# Patient Record
Sex: MALE | Race: WHITE | NOT HISPANIC OR LATINO | Employment: PART TIME | URBAN - NONMETROPOLITAN AREA
[De-identification: names, ages, dates, MRNs, and addresses within clinical notes are randomized per-mention and may not be internally consistent; named-entity substitution may affect disease eponyms.]

---

## 2019-10-31 ENCOUNTER — OFFICE VISIT (OUTPATIENT)
Dept: PSYCHIATRY | Facility: CLINIC | Age: 24
End: 2019-10-31

## 2019-10-31 DIAGNOSIS — F41.1 GENERALIZED ANXIETY DISORDER: Primary | ICD-10-CM

## 2019-10-31 PROCEDURE — 90791 PSYCH DIAGNOSTIC EVALUATION: CPT | Performed by: COUNSELOR
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
## 2019-11-05 ENCOUNTER — OFFICE VISIT (OUTPATIENT)
Dept: PSYCHIATRY | Facility: CLINIC | Age: 24
End: 2019-11-05

## 2019-11-05 VITALS
BODY MASS INDEX: 29.66 KG/M2 | HEIGHT: 72 IN | SYSTOLIC BLOOD PRESSURE: 122 MMHG | WEIGHT: 219 LBS | HEART RATE: 56 BPM | DIASTOLIC BLOOD PRESSURE: 68 MMHG

## 2019-11-05 DIAGNOSIS — F32.0 CURRENT MILD EPISODE OF MAJOR DEPRESSIVE DISORDER, UNSPECIFIED WHETHER RECURRENT (HCC): ICD-10-CM

## 2019-11-05 DIAGNOSIS — F41.1 GENERALIZED ANXIETY DISORDER: Primary | ICD-10-CM

## 2019-11-05 PROCEDURE — 90792 PSYCH DIAG EVAL W/MED SRVCS: CPT | Performed by: NURSE PRACTITIONER

## 2019-11-10 NOTE — PROGRESS NOTES
"Teodoro Nina is a 24 y.o. male who is here today for initial appointment.     Chief Complaint:     ICD-10-CM ICD-9-CM   1. Generalized anxiety disorder F41.1 300.02   2. Current mild episode of major depressive disorder, unspecified whether recurrent (CMS/[facility]) F32.0 296.21       HPI: Pt presents for initial visit and medication management of anxiety symptoms. Pt is not currently taking any psychiatric medications and reports a history of Bupropion  mg three years ago which made him feel lethargic. Pt reports daily anxiety symptoms and states he will have a panic episodes once every two weeks. Pt also reports feeling \"down\" and fatigued since September.  Pt is currently finishing up his bachelor's degree in social work. States he worries about being a failure and has difficulty with social interactions. Worries about how he is being perceived by others.     Pt reports the presence/absence of the following anxiety symptoms: (+) excessive worry, (+) excessive fear, (+) difficulty relaxing, (-) restlessness, (+) insomnia, (+) easily fatigued, (-) irritability, (-) poor concentration, (+) racing thoughts, and (+) panic episodes.       Pt reports the presence/absence of the following depressive symptoms: (+) depressed mood, (-) reduced appetite, (-) increased appetite, (-) poor concentration, (-) hopelessness, (+) worthlessness, (-) insomnia, (-) hypersomnia, (-) psychomotor agitation, (-) irritability, (-) anhedonia, (-) amotivation, (+) fatigue, (-) suicidal ideation, (-) suicidal plan, (-) suicidal intent, (-) recurrent thoughts about death, and (-) homicidal ideation.    Past Medical History:   Past Medical History:   Diagnosis Date   • Anxiety        Past Surgical History:   Past Surgical History:   Procedure Laterality Date   • ADENOIDECTOMY     • CHOLECYSTECTOMY     • TONSILLECTOMY         Social History:   Social History     Socioeconomic History   • Marital status: Single     Spouse name: Not on file   • " "Number of children: 0   • Years of education: Not on file   • Highest education level: Some college, no degree   Tobacco Use   • Smoking status: Current Every Day Smoker     Packs/day: 0.50     Types: Electronic Cigarette   • Smokeless tobacco: Never Used   Substance and Sexual Activity   • Alcohol use: Yes     Alcohol/week: 0.6 oz     Types: 1 Shots of liquor per week     Comment: OCCASIONAL   • Drug use: No   • Sexual activity: Yes     Partners: Male     Birth control/protection: None       Allergy:  No Known Allergies    Current Medications:   Current Outpatient Medications   Medication Sig Dispense Refill   • sertraline (ZOLOFT) 50 MG tablet Take 1/2 tablet PO daily x 1 week then increase to 1 tablet PO daily 27 tablet 0     No current facility-administered medications for this visit.        Lab Results:       Review of Symptoms:   Review of Systems   Constitutional: Negative.  Negative for activity change, appetite change, unexpected weight gain and unexpected weight loss.   Eyes: Negative.    Respiratory: Negative.    Cardiovascular: Negative.  Negative for chest pain.   Gastrointestinal: Negative.  Negative for diarrhea, nausea and vomiting.   Genitourinary: Negative.    Musculoskeletal: Negative.    Skin: Negative for rash and bruise.   Neurological: Negative.  Negative for dizziness, seizures and speech difficulty.   Psychiatric/Behavioral: Positive for sleep disturbance and depressed mood. Negative for agitation, behavioral problems, decreased concentration, dysphoric mood, hallucinations, self-injury, suicidal ideas, negative for hyperactivity and stress. The patient is nervous/anxious.        Physical Exam:   Physical Exam  Blood pressure 122/68, pulse 56, height 182.9 cm (72\"), weight 99.3 kg (219 lb).    Mental Status Exam:   Appearance: appropriate  Hygiene:   good  Cooperation:  Cooperative  Eye Contact:  Good  Psychomotor Behavior:  Appropriate  Mood:euthymic  Affect:  Appropriate  Hopelessness: " Denies  Speech:  Normal  Thought Process:  Goal directed  Thought Content:  Normal  Suicidal:  None  Homicidal:  None  Hallucinations:  None  Delusion:  None  Memory:  Intact  Orientation:  Person, Place, Time and Situation  Reliability:  good  Insight:  Good  Judgement:  Good  Impulse Control:  Good  Physical/Medical Issues:  No         Assessment/Plan   Diagnoses and all orders for this visit:    Generalized anxiety disorder  -     sertraline (ZOLOFT) 50 MG tablet; Take 1/2 tablet PO daily x 1 week then increase to 1 tablet PO daily    Current mild episode of major depressive disorder, unspecified whether recurrent (CMS/[facility])      Treatment Plan      Problem: Anxiety/Depression      Intervention: The prescription and adjustment of medications and monitoring of side effects. Add Zoloft. Continue therapy with NICA Mayo      Long term Goal: Overall improvement in mood and functioning per patient self -report      Short term Goal: Medication adherence. Improvement in symptoms per patient self-report.     A psychological evaluation was conducted in order to assess past and current level of functioning. Areas assessed included, but were not limited to: perception of social support, perception of ability to face and deal with challenges in life (positive functioning), anxiety symptoms, depressive symptoms, perspective on beliefs/belief system, coping skills for stress, intelligence level,  Therapeutic rapport was established. Interventions conducted today were geared towards incorporating medication management along with support for continued therapy. Education was also provided as to the med management with this provider and what to expect in subsequent sessions.    We discussed risks, benefits,goals and side effects of the above medication and the patient was agreeable with the plan.Patient was educated on the importance of compliance with treatment and follow-up appointments. Patient is aware to contact the  [facility] with any worsening of symptoms. To call for questions or concerns and return early if necessary. Patent is agreeable to go to the Emergency Department or call 911 should they begin SI/HI.     Return in about 4 weeks (around 12/3/2019) for Follow Up.    Kellie Irving, DNP, APRN, PMHNP-BC, FNP-C

## 2019-12-05 ENCOUNTER — OFFICE VISIT (OUTPATIENT)
Dept: PSYCHIATRY | Facility: CLINIC | Age: 24
End: 2019-12-05

## 2019-12-05 VITALS
SYSTOLIC BLOOD PRESSURE: 118 MMHG | HEIGHT: 72 IN | HEART RATE: 58 BPM | BODY MASS INDEX: 30.07 KG/M2 | DIASTOLIC BLOOD PRESSURE: 64 MMHG | WEIGHT: 222 LBS

## 2019-12-05 DIAGNOSIS — F32.0 CURRENT MILD EPISODE OF MAJOR DEPRESSIVE DISORDER, UNSPECIFIED WHETHER RECURRENT (HCC): ICD-10-CM

## 2019-12-05 DIAGNOSIS — F41.1 GENERALIZED ANXIETY DISORDER: Primary | ICD-10-CM

## 2019-12-05 PROCEDURE — 99214 OFFICE O/P EST MOD 30 MIN: CPT | Performed by: NURSE PRACTITIONER

## 2019-12-06 NOTE — PROGRESS NOTES
Teodoro Nina is a 24 y.o. male is here today for medication management follow-up.    Chief Complaint:      ICD-10-CM ICD-9-CM   1. Generalized anxiety disorder F41.1 300.02   2. Current mild episode of major depressive disorder, unspecified whether recurrent (CMS/[facility]) F32.0 296.21       History of Present Illness:  Pt presents for follow up visit and medication management of anxiety symptoms. Pt reports mild improvement in anxiety symptoms; states he does not feel as anxious but anxiety symptoms still persist. Pt denies any side effects but feels like his appetite has increased; has gained 3 lbs since starting medication but he attributes weight gain to Thanksgiving.      Pt reports the presence/absence of the following anxiety symptoms: (+) excessive worry, (+) excessive fear, (+) difficulty relaxing, (-) restlessness, (+) insomnia, (+) easily fatigued, (-) irritability, (-) poor concentration, (+) racing thoughts, and (+) panic episodes.         Pt reports the presence/absence of the following depressive symptoms: (+) depressed mood, (-) reduced appetite, (-) increased appetite, (-) poor concentration, (-) hopelessness, (+) worthlessness, (-) insomnia, (-) hypersomnia, (-) psychomotor agitation, (-) irritability, (-) anhedonia, (-) amotivation, (+) fatigue, (-) suicidal ideation, (-) suicidal plan, (-) suicidal intent, (-) recurrent thoughts about death, and (-) homicidal ideation.       The following portions of the patient's history were reviewed and updated as appropriate: allergies, current medications, past family history, past medical history, past social history, past surgical history and problem list.    Review of Systems;;  Review of Systems   Constitutional: Positive for appetite change. Negative for activity change and unexpected weight loss.   HENT: Negative.    Respiratory: Negative.    Cardiovascular: Negative.  Negative for chest pain.   Gastrointestinal: Negative.  Negative for diarrhea, nausea and  "vomiting.   Genitourinary: Negative.    Musculoskeletal: Negative.    Skin: Negative for rash and bruise.   Neurological: Negative.  Negative for dizziness, seizures and speech difficulty.   Psychiatric/Behavioral: Negative for agitation, behavioral problems, decreased concentration, dysphoric mood, hallucinations, self-injury, sleep disturbance, suicidal ideas, negative for hyperactivity, depressed mood and stress. The patient is nervous/anxious.        Physical Exam;;  Physical Exam  Blood pressure 118/64, pulse 58, height 182.9 cm (72\"), weight 101 kg (222 lb).    Current Medications;;    Current Outpatient Medications:   •  sertraline (ZOLOFT) 50 MG tablet, Take 1.5 tablets PO daily, Disp: 45 tablet, Rfl: 1    Lab Results:       Mental Status Exam:   Hygiene:   good  Cooperation:  Cooperative  Eye Contact:  Good  Psychomotor Behavior:  Appropriate  Mood:euthymic  Affect:  Appropriate  Hopelessness: Denies  Speech:  Normal  Thought Process:  Goal directed  Thought Content:  Normal  Suicidal:  None  Homicidal:  None  Hallucinations:  None  Delusion:  None  Memory:  Intact  Orientation:  Person, Place, Time and Situation  Reliability:  good  Insight:  Good  Judgement:  Good  Impulse Control:  Good  Physical/Medical Issues:  No         Assessment Plan;;  Diagnoses and all orders for this visit:    Generalized anxiety disorder  -     sertraline (ZOLOFT) 50 MG tablet; Take 1.5 tablets PO daily    Current mild episode of major depressive disorder, unspecified whether recurrent (CMS/HCC)  -     sertraline (ZOLOFT) 50 MG tablet; Take 1.5 tablets PO daily    Treatment Plan        Problem: Anxiety/Depression        Intervention: The prescription and adjustment of medications and monitoring of side effects. Increase Zoloft. Continue therapy with NICA Mayo        Long term Goal: Overall improvement in mood and functioning per patient self -report        Short term Goal: Medication adherence. Improvement in " symptoms per patient self-report.      A psychological evaluation was conducted in order to assess past and current level of functioning. Areas assessed included, but were not limited to: perception of social support, perception of ability to face and deal with challenges in life (positive functioning), anxiety symptoms, depressive symptoms, perspective on beliefs/belief system, coping skills for stress, intelligence level,  Therapeutic rapport was established. Interventions conducted today were geared towards incorporating medication management along with support for continued therapy. Education was also provided as to the med management with this provider and what to expect in subsequent sessions.    We discussed risks, benefits,goals and side effects of the above medication and the patient was agreeable with the plan.Patient was educated on the importance of compliance with treatment and follow-up appointments. Patient is aware to contact the [facility] with any worsening of symptoms. To call for questions or concerns and return early if necessary. Patent is agreeable to go to the Emergency Department or call 911 should they begin SI/HI.     Return in about 2 months (around 2/5/2020) for Follow Up.    Kellie Irving, DNP, APRN, PMHNP-BC, FNP-C

## 2020-01-06 ENCOUNTER — OFFICE VISIT (OUTPATIENT)
Dept: PSYCHIATRY | Facility: CLINIC | Age: 25
End: 2020-01-06

## 2020-01-06 DIAGNOSIS — F41.1 GENERALIZED ANXIETY DISORDER: Primary | ICD-10-CM

## 2020-01-06 DIAGNOSIS — F32.0 CURRENT MILD EPISODE OF MAJOR DEPRESSIVE DISORDER, UNSPECIFIED WHETHER RECURRENT (HCC): ICD-10-CM

## 2020-01-06 PROCEDURE — 90837 PSYTX W PT 60 MINUTES: CPT | Performed by: COUNSELOR

## 2020-01-06 NOTE — TREATMENT PLAN
Multi-Disciplinary Problems (from Behavioral Health Treatment Plan)    Active Problems     Problem: Anxiety  Start Date: 01/06/20    Problem Details:  The patient self-scales this problem as a 7 with 10 being the worst.       Goal Priority Start Date Expected End Date End Date    Patient will develop and implement behavioral and cognitive strategies to reduce anxiety and irrational fears. -- 01/06/20 -- --    Goal Details:  Progress toward goal:  Not appropriate to rate progress toward goal since this is the initial treatment plan.       Goal Intervention Frequency Start Date End Date    Help patient explore past emotional issues in relation to present anxiety. Weekly 01/06/20 --    Intervention Details:  Duration of treatment until until discharged.       Goal Intervention Frequency Start Date End Date    Help patient develop an awareness of their cognitive and physical responses to anxiety. Weekly 01/06/20 --    Intervention Details:  Duration of treatment until until discharged.             Problem: Relationship Issues  Start Date: 01/06/20    Problem Details:  The patient self-scales this problem as a 10 with 10 being the worst.       Goal Priority Start Date Expected End Date End Date    Patient will initiate personal change to improve relationships. -- 01/06/20 -- --    Goal Details:  Progress toward goal:  Not appropriate to rate progress toward goal since this is the initial treatment plan.       Goal Intervention Frequency Start Date End Date    Assist patient in identifying behaviors that focus on relationship building and process the changes needed to improve relationships. Weekly 01/06/20 --    Intervention Details:  Duration of treatment until until discharged.                          I have discussed and reviewed this treatment plan with the patient.  It has been printed for signatures.
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
## 2020-01-20 ENCOUNTER — OFFICE VISIT (OUTPATIENT)
Dept: PSYCHIATRY | Facility: CLINIC | Age: 25
End: 2020-01-20

## 2020-01-20 DIAGNOSIS — F41.1 GENERALIZED ANXIETY DISORDER: Primary | ICD-10-CM

## 2020-01-20 DIAGNOSIS — F32.0 CURRENT MILD EPISODE OF MAJOR DEPRESSIVE DISORDER, UNSPECIFIED WHETHER RECURRENT (HCC): ICD-10-CM

## 2020-01-20 PROCEDURE — 90837 PSYTX W PT 60 MINUTES: CPT | Performed by: COUNSELOR

## 2020-01-20 NOTE — PROGRESS NOTES
Date of Service: January 20, 2019  Time In: 10:30 AM  Time Out: 11:30 AM      PROGRESS NOTE    VISIT DIAGNOSIS:     ICD-10-CM ICD-9-CM   1. Generalized anxiety disorder F41.1 300.02   2. Current mild episode of major depressive disorder, unspecified whether recurrent (CMS/HCC) F32.0 296.21        Data:  Teodoro Nina is a 24 y.o. male who presents for individual therapy session at [facility].  Patient described feelings of depression as pervasive, irrational guilt.  Patient also verbalized understanding that his guilt was irrational, however it continues to plague him.  Patient has become more aware of his depression in terms of low self-esteem.  He has a negative perception of himself.  Patient shared situations from childhood that form the schemas perpetuating low self-esteem.  Patient seems motivated to make a change in his life and relationships.    Patient adamantly and convincingly denies current suicidal or homicidal ideation or perceptual disturbance.    Clinical Maneuvering/Intervention:  Assisted patient in processing above session content; acknowledged and normalized patient’s thoughts, feelings, and concerns.  Patient was provided with step 1 of her treatment manual of cognitive behavioral therapy that provides a structure for therapeutic intervention beyond sessions.  He was assigned chapter 1 to be read in between therapy appointments to help him learn skills to lessen depression and cope with thoughts and feelings maintaining his low self-esteem.  Structured therapy helps to summarize and organize learning so that patients can remember what is been used and discussed in therapy and build upon that.    Allowed patient to freely discuss issues without interruption or judgment. Provided safe, confidential environment to facilitate the development of positive therapeutic relationship and encourage open, honest communication. Assisted patient in identifying risk factors which would  indicate the need for higher level of care including thoughts to harm self or others and/or self-harming behavior and encouraged patient to contact this office, call 911, or present to the nearest emergency room should any of these events occur. Discussed crisis intervention services and means to access.        Mental Status Exam  Hygiene:  good  Dress:  casual  Attitude:  Cooperative  Motor Activity:  Appropriate  Speech:  Normal  Mood:  Sad, pleasant, hopeful  Affect:  depressed  Thought Processes:  Goal directed  Thought Content:  normal  Suicidal Thoughts:  denies  Homicidal Thoughts:  denies  Crisis Safety Plan: yes, to come to the emergency room.  Hallucinations:  {Actions; denies/admits to:5300  Functional Status: No impairment    Progress toward goal: Not at goal    Plan     Patient will continue in individual outpatient therapy with focus on improved functioning and coping skills. Clinical maneuvering will consist of, but not limited to, Cognitive Behavioral Therapy and Solution Focused Therapy to improve functioning, maintain stability, and avoid decompensation and the need for higher level of care.     Patient will adhere to medication regimen as prescribed and report any side effects. Patient will contact this office, call 911 or present to the nearest emergency room should suicidal or homicidal ideations occur.     Return in about 2 weeks, or earlier if symptoms worsen or fail to improve.           Sary Fang [facility]      Please note that portions of this note were completed with a voice recognition program. Efforts were made to edit dictation, but occasionally words are mistranscribed.

## 2020-02-03 ENCOUNTER — OFFICE VISIT (OUTPATIENT)
Dept: PSYCHIATRY | Facility: CLINIC | Age: 25
End: 2020-02-03

## 2020-02-03 DIAGNOSIS — F32.0 CURRENT MILD EPISODE OF MAJOR DEPRESSIVE DISORDER, UNSPECIFIED WHETHER RECURRENT (HCC): ICD-10-CM

## 2020-02-03 DIAGNOSIS — F41.1 GENERALIZED ANXIETY DISORDER: Primary | ICD-10-CM

## 2020-02-03 PROCEDURE — 90837 PSYTX W PT 60 MINUTES: CPT | Performed by: COUNSELOR

## 2020-02-03 NOTE — PROGRESS NOTES
".Date of Service: January 3, 2020  Time In: 9:30 AM  Time Out: 10:30 AM      PROGRESS NOTE  VISIT DIAGNOSIS:     ICD-10-CM ICD-9-CM   1. Generalized anxiety disorder F41.1 300.02   2. Current mild episode of major depressive disorder, unspecified whether recurrent (CMS/HCC) F32.0 296.21        Data:  Teodoro Nina is a 24 y.o. male who presents for individual therapy session at [facility]. Patient has begun to develop a more positive self-image and, therefore, does not feel \"stuck\" in his relationship any longer with a partner who \"relies on me for everything\". He has begun to make positive comments about himself. He has taken steps toward his future since the last session. Patient tells me that he applied for graduate school for social work. Current stressors include finding the courage to break up with s/o. He described feeling fearful of having to officially break up with his boyfriend as this is his first relationship and he does not like confrontation. He has been stress eating. Patient verbalized an understanding of how stress eating is maladaptive coping.     Patient adamantly and convincingly denies current suicidal or homicidal ideation or perceptual disturbance.    Clinical Maneuvering/Intervention:  Assisted patient in processing above session content; acknowledged and normalized patient’s thoughts, feelings, and concerns. Patient indicated that he has become increasingly aware of how he communicates his negative self-image; this progress was processed. Patient responds well to CBT. He was assigned self-esteem building exercises using CBT concepts.     Allowed patient to freely discuss issues without interruption or judgment. Provided safe, confidential environment to facilitate the development of positive therapeutic relationship and encourage open, honest communication. Assisted patient in identifying risk factors which would indicate the need for higher level of care including " thoughts to harm self or others and/or self-harming behavior and encouraged patient to contact this office, call 911, or present to the nearest emergency room should any of these events occur. Discussed crisis intervention services and means to access.        Mental Status Exam  Hygiene:  good  Dress:  casual  Attitude:  Cooperative  Motor Activity:  Appropriate  Speech:  Normal  Mood:  anxious  Affect:  calm and pleasant and anxious  Thought Processes:  Goal directed  Thought Content:  normal  Suicidal Thoughts:  denies  Homicidal Thoughts:  denies  Crisis Safety Plan: yes, to come to the emergency room.  Hallucinations:  {Actions; denies/admits to:5300  Functional Status: No impairment    Progress toward goal: Not at goal    Plan     Patient will continue in individual outpatient therapy with focus on improved functioning and coping skills. Clinical maneuvering will consist of, but not limited to, Cognitive Behavioral Therapy and Solution Focused Therapy to improve functioning, maintain stability, and avoid decompensation and the need for higher level of care.     Patient will adhere to medication regimen as prescribed and report any side effects. Patient will contact this office, call 911 or present to the nearest emergency room should suicidal or homicidal ideations occur.     Return in about 2 weeks, or earlier if symptoms worsen or fail to improve.         VISIT DIAGNOSIS:     ICD-10-CM ICD-9-CM   1. Generalized anxiety disorder F41.1 300.02   2. Current mild episode of major depressive disorder, unspecified whether recurrent (CMS/[facility]) F32.0 296.21        Sary Fang [facility]      Please note that portions of this note were completed with a voice recognition program. Efforts were made to edit dictation, but occasionally words are mistranscribed.

## 2020-02-06 DIAGNOSIS — F32.0 CURRENT MILD EPISODE OF MAJOR DEPRESSIVE DISORDER, UNSPECIFIED WHETHER RECURRENT (HCC): ICD-10-CM

## 2020-02-06 DIAGNOSIS — F41.1 GENERALIZED ANXIETY DISORDER: ICD-10-CM

## 2020-02-17 ENCOUNTER — OFFICE VISIT (OUTPATIENT)
Dept: PSYCHIATRY | Facility: CLINIC | Age: 25
End: 2020-02-17

## 2020-02-17 DIAGNOSIS — F41.1 GENERALIZED ANXIETY DISORDER: Primary | ICD-10-CM

## 2020-02-17 DIAGNOSIS — F32.0 CURRENT MILD EPISODE OF MAJOR DEPRESSIVE DISORDER, UNSPECIFIED WHETHER RECURRENT (HCC): ICD-10-CM

## 2020-02-17 PROCEDURE — 90837 PSYTX W PT 60 MINUTES: CPT | Performed by: COUNSELOR

## 2020-02-17 NOTE — PROGRESS NOTES
.Date of Service: February 17. 2020  Time In: 10:30 AM  Time Out: 11:30 AM      PROGRESS NOTE  VISIT DIAGNOSIS:     ICD-10-CM ICD-9-CM   1. Generalized anxiety disorder F41.1 300.02   2. Current mild episode of major depressive disorder, unspecified whether recurrent (CMS/[facility]) F32.0 296.21        Data:  Teodoro Nina is a 24 y.o. male who presents for individual therapy session at [facility]. Patient continues to struggle with breaking up with his long-term boyfriend. Although, he is the one initiating the break up, he is left deeply unpleasant feelings of guilt, angst, and grief.   He previously thought being alone was worse than being in a relationship that was very one-sided (patient pays all bills and does all housework) however he is no longer fearful of being alone. He is tired of being taken advantage of.  Pt is fearful of using assertiveness.  He started the conversation about dissatisfactions, broken promises, and trying to work through them as a team.  His boyfriend did not receive this information well and became very defensive.  He has not felt comfortable to reinitiate the conversation.  Patient reports his anxiety has been high, he has been going to bed around 7 PM to try to avoid his boyfriend and the having the conversation.       Patient adamantly and convincingly denies current suicidal or homicidal ideation or perceptual disturbance.    Clinical Maneuvering/Intervention:  Assisted patient in processing above session content; acknowledged and normalized patient’s thoughts, feelings, and concerns.  Provided psychoeducation on thorough communication.  We talked about letting his boyfriend know how he is feeling, even if he thinks this may hurt or disappoint his boyfriend.  It is important to include his partner as much as possible regarding discussions around his feeling so that the break-up does not take him by surprise.     Allowed patient to freely discuss issues without  interruption or judgment. Provided safe, confidential environment to facilitate the development of positive therapeutic relationship and encourage open, honest communication. Assisted patient in identifying risk factors which would indicate the need for higher level of care including thoughts to harm self or others and/or self-harming behavior and encouraged patient to contact this office, call 911, or present to the nearest emergency room should any of these events occur. Discussed crisis intervention services and means to access.        Mental Status Exam  Hygiene:  good  Dress:  casual  Attitude:  Cooperative  Motor Activity:  Appropriate  Speech:  Normal  Mood:  anxious  Affect:  anxious  Thought Processes:  Goal directed  Thought Content:  normal  Suicidal Thoughts:  denies  Homicidal Thoughts:  denies  Crisis Safety Plan: yes, to come to the emergency room.  Hallucinations:  denies  Functional Status: No impairment    Progress toward goal: Not at goal    Plan     Patient will continue in individual outpatient therapy with focus on improved functioning and coping skills. Clinical maneuvering will consist of, but not limited to, Cognitive Behavioral Therapy and Solution Focused Therapy to improve functioning, maintain stability, and avoid decompensation and the need for higher level of care.     Patient will adhere to medication regimen as prescribed and report any side effects. Patient will contact this office, call 911 or present to the nearest emergency room should suicidal or homicidal ideations occur.     Return in about 2 weeks, or earlier if symptoms worsen or fail to improve.         VISIT DIAGNOSIS:     ICD-10-CM ICD-9-CM   1. Generalized anxiety disorder F41.1 300.02   2. Current mild episode of major depressive disorder, unspecified whether recurrent (CMS/[facility]) F32.0 296.21        Sary Fang [facility]      Please note that portions of this note were completed with a voice recognition program. Efforts  were made to edit dictation, but occasionally words are mistranscribed.

## 2020-03-11 ENCOUNTER — OFFICE VISIT (OUTPATIENT)
Dept: PSYCHIATRY | Facility: CLINIC | Age: 25
End: 2020-03-11

## 2020-03-11 VITALS
DIASTOLIC BLOOD PRESSURE: 64 MMHG | HEIGHT: 72 IN | BODY MASS INDEX: 30.34 KG/M2 | HEART RATE: 57 BPM | WEIGHT: 224 LBS | SYSTOLIC BLOOD PRESSURE: 110 MMHG

## 2020-03-11 DIAGNOSIS — F41.1 GENERALIZED ANXIETY DISORDER: Primary | ICD-10-CM

## 2020-03-11 DIAGNOSIS — F40.10 SOCIAL ANXIETY DISORDER: ICD-10-CM

## 2020-03-11 PROCEDURE — 99214 OFFICE O/P EST MOD 30 MIN: CPT | Performed by: NURSE PRACTITIONER

## 2020-03-11 RX ORDER — HYDROXYZINE HYDROCHLORIDE 10 MG/1
TABLET, FILM COATED ORAL
Qty: 60 TABLET | Refills: 2 | Status: SHIPPED | OUTPATIENT
Start: 2020-03-11 | End: 2020-06-16 | Stop reason: SDUPTHER

## 2020-03-11 RX ORDER — SERTRALINE HYDROCHLORIDE 100 MG/1
100 TABLET, FILM COATED ORAL DAILY
Qty: 30 TABLET | Refills: 2 | Status: SHIPPED | OUTPATIENT
Start: 2020-03-11 | End: 2020-06-16 | Stop reason: SDUPTHER

## 2020-03-11 NOTE — PROGRESS NOTES
"Teodoro Nina is a 24 y.o. male is here today for medication management follow-up.    Chief Complaint:      ICD-10-CM ICD-9-CM   1. Generalized anxiety disorder F41.1 300.02   2. Social anxiety disorder F40.10 300.23       History of Present Illness:  Pt presents for follow up visit and medication management of anxiety symptoms. Pt reports some continued anxiety symptoms since Zoloft was increased to 75 mg. Reports concern over social anxiety symptoms; states he will feel cold and hands become \"sweaty\". Pt states he has decided to take a year off before attending graduate school.     Pt reports the presence/absence of the following anxiety symptoms: (+) excessive worry, (+) excessive fear, (+) difficulty relaxing, (-) restlessness, (-) insomnia, (-) easily fatigued, (-) irritability, (-) poor concentration, (+) racing thoughts, and (+) panic episodes.           The following portions of the patient's history were reviewed and updated as appropriate: allergies, current medications, past family history, past medical history, past social history, past surgical history and problem list.    Review of Systems;;  Review of Systems   Constitutional: Negative.  Negative for activity change, appetite change and unexpected weight loss.   HENT: Negative.    Respiratory: Negative.    Cardiovascular: Negative.  Negative for chest pain.   Gastrointestinal: Negative.  Negative for diarrhea, nausea and vomiting.   Genitourinary: Negative.    Musculoskeletal: Negative.    Skin: Negative for rash and bruise.   Neurological: Negative.  Negative for dizziness, seizures and speech difficulty.   Psychiatric/Behavioral: Negative for agitation, behavioral problems, decreased concentration, dysphoric mood, hallucinations, self-injury, sleep disturbance, suicidal ideas, negative for hyperactivity, depressed mood and stress. The patient is nervous/anxious.        Physical Exam;;  Physical Exam  Blood pressure 110/64, pulse 57, height 182.9 cm " "(72\"), weight 102 kg (224 lb).    Current Medications;;    Current Outpatient Medications:   •  hydrOXYzine (ATARAX) 10 MG tablet, Take 1 to 2 tablets PO daily prn panic, Disp: 60 tablet, Rfl: 2  •  sertraline (ZOLOFT) 100 MG tablet, Take 1 tablet by mouth Daily., Disp: 30 tablet, Rfl: 2    Lab Results:       Mental Status Exam:   Hygiene:   good  Cooperation:  Cooperative  Eye Contact:  Good  Psychomotor Behavior:  Appropriate  Mood:euthymic  Affect:  Appropriate  Hopelessness: Denies  Speech:  Normal  Thought Process:  Goal directed  Thought Content:  Normal  Suicidal:  None  Homicidal:  None  Hallucinations:  None  Delusion:  None  Memory:  Intact  Orientation:  Person, Place, Time and Situation  Reliability:  good  Insight:  Good  Judgement:  Good  Impulse Control:  Good  Physical/Medical Issues:  No         Assessment Plan;;  Diagnoses and all orders for this visit:    Generalized anxiety disorder  -     sertraline (ZOLOFT) 100 MG tablet; Take 1 tablet by mouth Daily.    Social anxiety disorder  -     sertraline (ZOLOFT) 100 MG tablet; Take 1 tablet by mouth Daily.  -     hydrOXYzine (ATARAX) 10 MG tablet; Take 1 to 2 tablets PO daily prn panic    Treatment Plan        Problem: Anxiety/Depression        Intervention: The prescription and adjustment of medications and monitoring of side effects. Increase Zoloft. Add Hydroxyzine prn. [facility]        Long term Goal: Overall improvement in mood and functioning per patient self -report        Short term Goal: Medication adherence. Improvement in symptoms per patient self-report.      A psychological evaluation was conducted in order to assess past and current level of functioning. Areas assessed included, but were not limited to: perception of social support, perception of ability to face and deal with challenges in life (positive functioning), anxiety symptoms, depressive symptoms, perspective on beliefs/belief system, coping " skills for stress, intelligence level,  Therapeutic rapport was established. Interventions conducted today were geared towards incorporating medication management along with support for continued therapy. Education was also provided as to the med management with this provider and what to expect in subsequent sessions.    We discussed risks, benefits,goals and side effects of the above medication and the patient was agreeable with the plan.Patient was educated on the importance of compliance with treatment and follow-up appointments. Patient is aware to contact the [facility] with any worsening of symptoms. To call for questions or concerns and return early if necessary. Patent is agreeable to go to the Emergency Department or call 911 should they begin SI/HI.     Return in about 3 months (around 6/11/2020) for Follow Up.    Kellie Irving, DNP, APRN, PMHNP-BC, FNP-C

## 2020-03-16 ENCOUNTER — OFFICE VISIT (OUTPATIENT)
Dept: PSYCHIATRY | Facility: CLINIC | Age: 25
End: 2020-03-16

## 2020-03-16 DIAGNOSIS — F41.1 GENERALIZED ANXIETY DISORDER: Primary | ICD-10-CM

## 2020-03-16 PROCEDURE — 90837 PSYTX W PT 60 MINUTES: CPT | Performed by: COUNSELOR

## 2020-03-16 NOTE — PROGRESS NOTES
"Date of Service: March 16, 2020  Time In: 9:30 AM  Time Out: 10:30 AM      PROGRESS NOTE  VISIT DIAGNOSIS:     ICD-10-CM ICD-9-CM   1. Generalized anxiety disorder F41.1 300.02        Data:  Teodoro Nina is a 24 y.o. male who presents for individual therapy session at [facility].  Patient continues to struggle with his relationship.  He tells me he is having trouble navigating the ins and outs of .  He has feelings for another friend but still cannot find the nerve to break-up with his long-term boyfriend to even explore those feelings.  Patient tells me his current relationship has dissolved into just being roommates.  Patient discussed feeling responsible for his boyfriend.  He pays the bills and helps his boyfriend a lot with his emotional issues.  Patient has been reading a lot about how common it is for young bergman men to have these experiences -- meaning they've had very little experience with dating and the opportunity to make \"teenage mistakes\".  He does not feel like people understand what he is going through.  Most of his friends have had relationships in the past but this is his first one.  His mother is not very understanding either.    Patient adamantly and convincingly denies current suicidal or homicidal ideation or perceptual disturbance.    Clinical Maneuvering/Intervention:  Assisted patient in processing above session content; acknowledged and normalized patient’s thoughts, feelings, and concerns.  Supportive psychotherapy was provided as he discussed his fear of  from his long-term boyfriend and first relationship.  Assisted him in discussing the intensity of his feared situation and normalized the fears having significance and validity.  We focused on reducing the intensity of his reaction to the fear, therefore further reducing the anxiety to his situation in general.    Allowed patient to freely discuss issues without interruption or judgment. Provided " safe, confidential environment to facilitate the development of positive therapeutic relationship and encourage open, honest communication. Assisted patient in identifying risk factors which would indicate the need for higher level of care including thoughts to harm self or others and/or self-harming behavior and encouraged patient to contact this office, call 911, or present to the nearest emergency room should any of these events occur. Discussed crisis intervention services and means to access.        Mental Status Exam  Hygiene:  good  Dress:  casual  Attitude:  Cooperative  Motor Activity:  Appropriate  Speech:  Normal  Mood:  anxious  Affect:  calm and pleasant  Thought Processes:  Goal directed  Thought Content:  normal  Suicidal Thoughts:  denies  Homicidal Thoughts:  denies  Crisis Safety Plan: yes, to come to the emergency room.  Hallucinations:  denies  Functional Status: No impairment    Progress toward goal: Not at goal    Plan     Patient will continue in individual outpatient therapy with focus on improved functioning and coping skills. Clinical maneuvering will consist of, but not limited to, Cognitive Behavioral Therapy and Solution Focused Therapy to improve functioning, maintain stability, and avoid decompensation and the need for higher level of care.     Patient will adhere to medication regimen as prescribed and report any side effects. Patient will contact this office, call 911 or present to the nearest emergency room should suicidal or homicidal ideations occur.     Return in about 2 weeks, or earlier if symptoms worsen or fail to improve.    Reiterated our 24-hour cancellation/no show notice preference      VISIT DIAGNOSIS:     ICD-10-CM ICD-9-CM   1. Generalized anxiety disorder F41.1 300.02        [facility]      Please note that portions of this note were completed with a voice recognition program. Efforts were made to edit dictation, but occasionally words are  mistranscribed.

## 2020-03-30 ENCOUNTER — OFFICE VISIT (OUTPATIENT)
Dept: PSYCHIATRY | Facility: CLINIC | Age: 25
End: 2020-03-30

## 2020-03-30 DIAGNOSIS — F41.1 GENERALIZED ANXIETY DISORDER: Primary | ICD-10-CM

## 2020-03-30 DIAGNOSIS — F32.0 CURRENT MILD EPISODE OF MAJOR DEPRESSIVE DISORDER, UNSPECIFIED WHETHER RECURRENT (HCC): ICD-10-CM

## 2020-03-30 PROCEDURE — 90837 PSYTX W PT 60 MINUTES: CPT | Performed by: COUNSELOR

## 2020-03-30 NOTE — PROGRESS NOTES
Date of Service: March 30, 2020  Time In: 9:30 AM  Time Out: 10:30 AM      PROGRESS NOTE  VISIT DIAGNOSIS:     ICD-10-CM ICD-9-CM   1. Generalized anxiety disorder F41.1 300.02   2. Current mild episode of major depressive disorder, unspecified whether recurrent (CMS/HCC) F32.0 296.21        Data:  Teodoro Nina is a 24 y.o. male who presents for individual therapy session at [facility]. Patient reports having general stress about the coronavirus.  Patient reports he often struggles with anxiety regarding public health issues and the current coronavirus outbreak is triggering increased anxiety, especially with heightened media attention.    Patient states he has been nuno enough to keep his job and regular hours during this time.  When he is not at work, he tries to stay inside and exercise social distancing as often as possible.  Patient has noticed at work that he seems more anxious and his coworkers have noticed he is jittery.  He reported a low frustration tolerance at work.  Things at home are not great with his long-term boyfriend.  He is ready to in the relationship and his boyfriend is desperately trying to hang onto what is left.  Patient states it is annoying him.     Patient adamantly and convincingly denies current suicidal or homicidal ideation or perceptual disturbance.    Clinical Maneuvering/Intervention:  Assisted patient in processing above session content; acknowledged and normalized patient’s thoughts, feelings, and concerns. Assisted patient in utilizing stress reduction techniques. Provided patient with helpful tips and strategies to help herself and loved ones struggling with anxiety around the coronavirus. He was encouraged to use the resources about how to mitigate against increased anxiety during this time. Allowed patient to freely discuss issues without interruption or judgment. Provided safe, confidential environment to facilitate the development of positive  therapeutic relationship and encourage open, honest communication. Assisted patient in identifying risk factors which would indicate the need for higher level of care including thoughts to harm self or others and/or self-harming behavior and encouraged patient to contact this office, call 911, or present to the nearest emergency room should any of these events occur. Discussed crisis intervention services and means to access.       Mental Status Exam  Hygiene:  good  Dress:  casual  Attitude:  Cooperative  Motor Activity:  Appropriate  Speech:  Normal  Mood:  anxious  Affect:  calm and pleasant  Thought Processes:  Goal directed  Thought Content:  normal  Suicidal Thoughts:  denies  Homicidal Thoughts:  denies  Crisis Safety Plan: yes, to come to the emergency room.  Hallucinations:  denies  Functional Status: No impairment    Progress toward goal: Not at goal    Plan     Patient will continue in individual outpatient therapy with focus on improved functioning and coping skills. Clinical maneuvering will consist of, but not limited to, Cognitive Behavioral Therapy and Solution Focused Therapy to improve functioning, maintain stability, and avoid decompensation and the need for higher level of care.     Patient will adhere to medication regimen as prescribed and report any side effects. Patient will contact this office, call 911 or present to the nearest emergency room should suicidal or homicidal ideations occur.     Return in about 2 weeks, or earlier if symptoms worsen or fail to improve.    Reiterated our 24-hour cancellation/no show notice preference      VISIT DIAGNOSIS:     ICD-10-CM ICD-9-CM   1. Generalized anxiety disorder F41.1 300.02   2. Current mild episode of major depressive disorder, unspecified whether recurrent (CMS/[facility]) F32.0 296.21        Sary Fang [facility]      Please note that portions of this note were completed with a voice recognition program. Efforts were made to edit dictation, but  occasionally words are mistranscribed.

## 2020-05-12 ENCOUNTER — DOCUMENTATION (OUTPATIENT)
Dept: PSYCHIATRY | Facility: CLINIC | Age: 25
End: 2020-05-12

## 2020-05-12 NOTE — PROGRESS NOTES
.DISCHARGE SUMMARY   Name: Teodoro Nina   Date: May 12, 2020   Date of First Consultation: 10.31.2019   Duration of the Treatment: 6 months     Summary of the Presenting Difficulties:  Patient presented to therapy with classic symptoms of anxiety -- irrational worry and repetitive thoughts that something tragic is going to happen. He struggled with social interactions and had a tendency to overanalyze everything and everyone around him.   Other Areas Addressed During Treatment:   Through the course of treatment, it became evident that the client was experiencing anxiety. Addressing occasional panic symptoms also became a focus of our work together. Issues of relational discord also became an important focus of the treatment.     Overview of the Treatment Process:   The client was seen on a weekly/biweekly basis in supportive-dynamic psychotherapy with the incorporation of cognitive-behavioral techniques to address his chief complaints. A good therapeutic alliance was easily established and maintained throughout the course of our work together. During the initial phase of treatment, the patient gained awareness regarding  psychological/emotional position within the therapeutic relationship. Therapy provided a place within which patient could express and explore his concerns, while being available as support to family and friends.       Nature of the Termination:  The patient did experience significant improvement and gains throughout the treatment. Patient expressed the wish to terminate treatment based on feeling satisfied with his level of improvement and COVID-19 pandemic.     Gains Made/Progress:   Patient made significant gains during the course of treatment and reported being satisfied with his progress. Early on, patient came to appreciate and value capacity to express needs, thoughts, and feelings when feeling entitled to do so. Throughout the course of therapy, the patient gained greater awareness of a tendency  to neglect his own needs and wishes and developed improved balance between his work, relationship life, and leisure/creative/play time. The client was also able to use the space of the therapy to explore and better clarify  aspirations and the factors that had been preventing making decisions in this regard. The supportive features of the therapy appeared to be very helpful in enhancing the patient's coping skills.   Limitations of the treatment:   Patient discontinued therapy due to COVID19 limiting in person sessions. He did not want to engage in therapy virtually.   Remaining difficulties and/or concerns:  There were no significant remaining difficulties or concerns observed or expressed by the patient at the end of treatment.   Recommendations:   Though the client made tremendous progress and met main goals during the course of treatment, in the interest of maximizing psychological well-being and self-understanding, the patient may benefit from ongoing exploratory psychodynamic/analytic therapy in the future.   Follow-up:  No specific follow-up plan is indicated; the client was informed and is free to contact me in the future if needed.   Additional Comments:  Encouraged him to return to therapy when he feels safe to do so.       This document has been electronically signed by NICA Mayo, [facility]  May 12, 2020 12:41

## 2020-06-16 ENCOUNTER — TELEMEDICINE (OUTPATIENT)
Dept: PSYCHIATRY | Facility: CLINIC | Age: 25
End: 2020-06-16

## 2020-06-16 DIAGNOSIS — F40.10 SOCIAL ANXIETY DISORDER: ICD-10-CM

## 2020-06-16 DIAGNOSIS — F41.1 GENERALIZED ANXIETY DISORDER: ICD-10-CM

## 2020-06-16 PROCEDURE — 99213 OFFICE O/P EST LOW 20 MIN: CPT | Performed by: NURSE PRACTITIONER

## 2020-06-16 RX ORDER — SERTRALINE HYDROCHLORIDE 100 MG/1
100 TABLET, FILM COATED ORAL DAILY
Qty: 30 TABLET | Refills: 5 | Status: SHIPPED | OUTPATIENT
Start: 2020-06-16 | End: 2020-07-28 | Stop reason: SDUPTHER

## 2020-06-16 RX ORDER — HYDROXYZINE HYDROCHLORIDE 10 MG/1
TABLET, FILM COATED ORAL
Qty: 60 TABLET | Refills: 5 | Status: SHIPPED | OUTPATIENT
Start: 2020-06-16 | End: 2020-12-14 | Stop reason: SDUPTHER

## 2020-06-16 NOTE — PROGRESS NOTES
Teodoro Nina is a 25 y.o. male is here today for medication management follow-up.    Chief Complaint:      ICD-10-CM ICD-9-CM   1. Generalized anxiety disorder F41.1 300.02   2. Social anxiety disorder F40.10 300.23       History of Present Illness:  Pt presents for follow up visit and medication management of anxiety symptoms via [employer]. Pt reports stable mood symptoms with current medications.    Pt reports the presence/absence of the following anxiety symptoms: (-) excessive worry, (-) excessive fear, (-) difficulty relaxing, (-) restlessness, (-) insomnia, (-) easily fatigued, (-) irritability, (-) poor concentration, (-) racing thoughts, and (-) panic episodes.      The following portions of the patient's history were reviewed and updated as appropriate: allergies, current medications, past family history, past medical history, past social history, past surgical history and problem list.    Review of Systems;;  Review of Systems   Constitutional: Negative.  Negative for activity change, appetite change and unexpected weight loss.   HENT: Negative.    Respiratory: Negative.    Cardiovascular: Negative.  Negative for chest pain.   Gastrointestinal: Negative.  Negative for diarrhea, nausea and vomiting.   Genitourinary: Negative.    Musculoskeletal: Negative.    Skin: Negative for rash and bruise.   Neurological: Negative.  Negative for dizziness, seizures and speech difficulty.   Psychiatric/Behavioral: Negative.  Negative for agitation, behavioral problems, decreased concentration, dysphoric mood, hallucinations, self-injury, sleep disturbance, suicidal ideas, negative for hyperactivity, depressed mood and stress. The patient is not nervous/anxious.        Physical Exam;;  Physical Exam    There were no vitals taken for this visit.    Current Medications;;    Current Outpatient Medications:   •  hydrOXYzine (ATARAX) 10 MG tablet, Take 1 to 2 tablets PO daily prn panic, Disp: 60 tablet, Rfl: 5  •   sertraline (Zoloft) 100 MG tablet, Take 1 tablet by mouth Daily., Disp: 30 tablet, Rfl: 5    Lab Results:       Mental Status Exam:   Hygiene:   good  Cooperation:  Cooperative  Eye Contact:  Good  Psychomotor Behavior:  Appropriate  Mood:euthymic  Affect:  Appropriate  Hopelessness: Denies  Speech:  Normal  Thought Process:  Goal directed  Thought Content:  Normal  Suicidal:  None  Homicidal:  None  Hallucinations:  None  Delusion:  None  Memory:  Intact  Orientation:  Person, Place, Time and Situation  Reliability:  good  Insight:  Good  Judgement:  Good  Impulse Control:  Good  Physical/Medical Issues:  No         Assessment Plan;;  Diagnoses and all orders for this visit:    Generalized anxiety disorder  -     sertraline (Zoloft) 100 MG tablet; Take 1 tablet by mouth Daily.    Social anxiety disorder  -     hydrOXYzine (ATARAX) 10 MG tablet; Take 1 to 2 tablets PO daily prn panic  -     sertraline (Zoloft) 100 MG tablet; Take 1 tablet by mouth Daily.    Treatment Plan        Problem: Anxiety/Depression        Intervention: The prescription and adjustment of medications and monitoring of side effects. Continue current dose of Zoloft and  Hydroxyzine prn. Continue therapy with NICA Mayo at [facility].        Long term Goal: Overall improvement in mood and functioning per patient self -report        Short term Goal: Medication adherence. Improvement in symptoms per patient self-report.      A psychological evaluation was conducted in order to assess past and current level of functioning. Areas assessed included, but were not limited to: perception of social support, perception of ability to face and deal with challenges in life (positive functioning), anxiety symptoms, depressive symptoms, perspective on beliefs/belief system, coping skills for stress, intelligence level,  Therapeutic rapport was established. Interventions conducted today were geared towards incorporating medication management along with  support for continued therapy. Education was also provided as to the med management with this provider and what to expect in subsequent sessions.    We discussed risks, benefits,goals and side effects of the above medication and the patient was agreeable with the plan.Patient was educated on the importance of compliance with treatment and follow-up appointments. Patient is aware to contact the [facility] with any worsening of symptoms. To call for questions or concerns and return early if necessary. Patent is agreeable to go to the Emergency Department or call 911 should they begin SI/HI.     Return in about 6 months (around 12/16/2020) for Follow Up.    Kellie Irving, DNP, APRN, PMHNP-BC, FNP-C

## 2020-07-28 ENCOUNTER — TELEPHONE (OUTPATIENT)
Dept: PSYCHIATRY | Facility: CLINIC | Age: 25
End: 2020-07-28

## 2020-07-28 DIAGNOSIS — F40.10 SOCIAL ANXIETY DISORDER: ICD-10-CM

## 2020-07-28 DIAGNOSIS — F41.1 GENERALIZED ANXIETY DISORDER: ICD-10-CM

## 2020-07-28 RX ORDER — SERTRALINE HYDROCHLORIDE 100 MG/1
150 TABLET, FILM COATED ORAL DAILY
Qty: 45 TABLET | Refills: 2 | Status: SHIPPED | OUTPATIENT
Start: 2020-07-28 | End: 2020-09-14 | Stop reason: SDUPTHER

## 2020-07-28 NOTE — TELEPHONE ENCOUNTER
Attempted to contact pt voicemail is full will try to contact pt later this evening or in the morning if pt doesn't answer to give options on medication advice

## 2020-07-28 NOTE — TELEPHONE ENCOUNTER
If Zoloft helped previously and he is not having side effects, could consider dose increase. Otherwise, he would likely need to switch to an alternate medication.

## 2020-07-28 NOTE — TELEPHONE ENCOUNTER
Pt called in stating that he was extremely irritable wanting to snap on friends, family, management at work. Started about two weeks ago. He said he feels he's not thinking clearly and very irritable, (like how he felt before he started medication) Has taken Wellbutrin in the past. Currently taking Zoloft 100 mg and hydroxyzine as needed for panic.  Pt states nothing has really changed in his life, his dad did have a heart attack 3 weeks ago but doesn't feel its related to his feelings. Please Advise

## 2020-09-14 DIAGNOSIS — F40.10 SOCIAL ANXIETY DISORDER: ICD-10-CM

## 2020-09-14 DIAGNOSIS — F41.1 GENERALIZED ANXIETY DISORDER: ICD-10-CM

## 2020-09-14 RX ORDER — SERTRALINE HYDROCHLORIDE 100 MG/1
150 TABLET, FILM COATED ORAL DAILY
Qty: 45 TABLET | Refills: 2 | Status: SHIPPED | OUTPATIENT
Start: 2020-09-14 | End: 2020-12-14 | Stop reason: SDUPTHER

## 2020-12-14 ENCOUNTER — OFFICE VISIT (OUTPATIENT)
Dept: PSYCHIATRY | Facility: CLINIC | Age: 25
End: 2020-12-14

## 2020-12-14 VITALS
RESPIRATION RATE: 18 BRPM | DIASTOLIC BLOOD PRESSURE: 74 MMHG | BODY MASS INDEX: 30.75 KG/M2 | HEART RATE: 58 BPM | SYSTOLIC BLOOD PRESSURE: 118 MMHG | TEMPERATURE: 97.4 F | HEIGHT: 73 IN | WEIGHT: 232 LBS

## 2020-12-14 DIAGNOSIS — F32.0 CURRENT MILD EPISODE OF MAJOR DEPRESSIVE DISORDER, UNSPECIFIED WHETHER RECURRENT (HCC): Primary | ICD-10-CM

## 2020-12-14 DIAGNOSIS — F40.10 SOCIAL ANXIETY DISORDER: ICD-10-CM

## 2020-12-14 DIAGNOSIS — F41.1 GENERALIZED ANXIETY DISORDER: ICD-10-CM

## 2020-12-14 PROCEDURE — 99214 OFFICE O/P EST MOD 30 MIN: CPT | Performed by: NURSE PRACTITIONER

## 2020-12-14 RX ORDER — SERTRALINE HYDROCHLORIDE 100 MG/1
150 TABLET, FILM COATED ORAL DAILY
Qty: 45 TABLET | Refills: 2 | Status: SHIPPED | OUTPATIENT
Start: 2020-12-14 | End: 2021-04-14 | Stop reason: SDUPTHER

## 2020-12-14 RX ORDER — HYDROXYZINE HYDROCHLORIDE 10 MG/1
TABLET, FILM COATED ORAL
Qty: 60 TABLET | Refills: 5 | Status: SHIPPED | OUTPATIENT
Start: 2020-12-14 | End: 2021-11-02 | Stop reason: SDUPTHER

## 2020-12-14 RX ORDER — BUPROPION HYDROCHLORIDE 150 MG/1
150 TABLET ORAL DAILY
Qty: 30 TABLET | Refills: 0 | Status: SHIPPED | OUTPATIENT
Start: 2020-12-14 | End: 2021-01-18 | Stop reason: SDUPTHER

## 2020-12-14 NOTE — PROGRESS NOTES
"Subjective   Teodoro Nina is a 25 y.o. male who presents today for follow up    Chief Complaint:  Anxiety and depression    History of Present Illness:  Teodoro is a 25-year-old,  male who presents by himself for an initial evaluation as he was by a previous provider. Teodoro states \" I have been having some episodes of depression for about the last two weeks.\"  Teodoro tells me that his depressive symptoms include a depressed mood, anhedonia, no motivation, fatigue, increase in his eating, decreased in concentration, psychomotor retardation, and feelings of hopelessness.  He tells me that when he is feeling depressed he tends to \"push people away.\"  Teodoro denies any suicidal ideations.  He tells me that his anxiety has been improved with Zoloft.  He continues to work at [employer] and will be starting grad school at the [employer] next month.  He is pursuing a masters degree in social work.  He has a bachelor's degree in social work.  Teodoro currently lives by himself. He had a breakup of a long-term relationship in April. They remain in contact but it is a source of stress for Teodoro as his former significant other is in a new relationship. Teodoro tells me that he initiated the breakup after several months of therapy.  He is taking Zoloft and Atarax for anxiety and depression.  He denies any side effects of his current medication regiment.  He denies any problems with sleep.  He denies any SI/HI/AVH.    Teodoro lives in [place], [place] by himself.  He is from [place], [place].  He works at [employer] full-time.  He will begin his masters degree in January for social work.  He will be attendin the [employer].  He is not in a relationship at this time.  He does not have any biological children.  In his spare time, he enjoys hiking, being outdoors, traveling, taking drives, and cleaning.  Teodoro endorses using electronic cigarettes and occasional alcohol use.  He denies any illicit drug use.    The " following portions of the patient's history were reviewed and updated as appropriate: allergies, current medications, past family history, past medical history, past social history, past surgical history and problem list.    Past Medical History:  Past Medical History:   Diagnosis Date   • Anxiety        Social History:  Social History     Socioeconomic History   • Marital status: Single     Spouse name: Not on file   • Number of children: 0   • Years of education: Not on file   • Highest education level: Some college, no degree   Tobacco Use   • Smoking status: Former Smoker     Packs/day: 0.25     Types: Electronic Cigarette   • Smokeless tobacco: Never Used   Substance and Sexual Activity   • Alcohol use: Yes     Alcohol/week: 1.0 standard drinks     Types: 1 Shots of liquor per week     Comment: OCCASIONAL   • Drug use: No   • Sexual activity: Yes     Partners: Male     Birth control/protection: None       Family History:  Family History   Problem Relation Age of Onset   • Anxiety disorder Mother    • Drug abuse Mother    • Bipolar disorder Father    • Depression Father    • Drug abuse Father    • Suicide Attempts Father    • Bipolar disorder Sister    • Drug abuse Sister    • ODD Nephew    • ADD / ADHD Neg Hx    • Alcohol abuse Neg Hx    • Dementia Neg Hx    • OCD Neg Hx    • Schizophrenia Neg Hx    • Paranoid behavior Neg Hx    • Self-Injurious Behavior  Neg Hx    • Seizures Neg Hx        Past Surgical History:  Past Surgical History:   Procedure Laterality Date   • ADENOIDECTOMY     • CHOLECYSTECTOMY     • TONSILLECTOMY         Problem List:  There is no problem list on file for this patient.      Allergy:   No Known Allergies     Current Medications:   Current Outpatient Medications   Medication Sig Dispense Refill   • hydrOXYzine (ATARAX) 10 MG tablet Take 1 to 2 tablets PO daily prn panic 60 tablet 5   • sertraline (Zoloft) 100 MG tablet Take 1.5 tablets by mouth Daily. 45 tablet 2   • buPROPion XL  "(Wellbutrin XL) 150 MG 24 hr tablet Take 1 tablet by mouth Daily. 30 tablet 0     No current facility-administered medications for this visit.        Review of Symptoms:    Review of Systems   Constitutional: Positive for appetite change (increased) and fatigue. Negative for chills, fever, unexpected weight gain and unexpected weight loss.   HENT: Negative.    Eyes: Negative.    Respiratory: Negative for cough and shortness of breath.    Cardiovascular: Negative for chest pain and palpitations.   Gastrointestinal: Negative for abdominal pain, constipation, diarrhea, vomiting and indigestion.   Musculoskeletal: Negative for arthralgias, gait problem and joint swelling.   Skin: Negative.    Allergic/Immunologic: Negative.    Neurological: Negative for dizziness, speech difficulty, weakness, memory problem and confusion.   Psychiatric/Behavioral: Positive for decreased concentration and depressed mood. Negative for behavioral problems, sleep disturbance and suicidal ideas. The patient is nervous/anxious (improved).        PHQ-9 Score:   PHQ-9 Total Score: 10     Physical Exam:   Blood pressure 118/74, pulse 58, temperature 97.4 °F (36.3 °C), temperature source Infrared, resp. rate 18, height 185.4 cm (73\"), weight 105 kg (232 lb). Body mass index is 30.61 kg/m².     Physical Exam  Vitals signs and nursing note reviewed.   Constitutional:       Appearance: He is well-developed.   Musculoskeletal: Normal range of motion.   Skin:     General: Skin is warm and dry.   Neurological:      Mental Status: He is alert and oriented to person, place, and time.   Psychiatric:         Attention and Perception: Attention normal.         Mood and Affect: Mood is depressed.         Speech: Speech normal.         Behavior: Behavior normal. Behavior is cooperative.         Thought Content: Thought content normal.         Cognition and Memory: Cognition normal.         Judgment: Judgment normal.          Appearance: Well-developed, " well-nourished, appears stated age, and NAD  Gait, Station, Strength: WNL    Patient's Support Network Includes:  parents    Functional Status: Mild impairment     Progress toward goal: Not at goal    Prognosis: Fair with Ongoing Treatment     Mental Status Exam:   Hygiene:   good  Cooperation:  Cooperative  Eye Contact:  Fair  Psychomotor Behavior:  Slow  Affect:  Restricted  Mood: depressed  Hopelessness: 2  Speech:  Normal  Thought Process:  Goal directed and Linear  Thought Content:  Normal  Suicidal:  None  Homicidal:  None  Hallucinations:  None  Delusion:  None  Memory:  Intact  Orientation:  Person, Place, Time and Situation  Reliability:  good  Insight:  Fair  Judgement:  Good  Impulse Control:  Good  Physical/Medical Issues:  No      Lab Results:   No visits with results within 1 Month(s) from this visit.   Latest known visit with results is:   Admission on 08/13/2020, Discharged on 08/13/2020   Component Date Value Ref Range Status   • SARS-CoV-2, SAMMIE 08/13/2020 Not Detected  Not Detected Final    This test was developed and its performance characteristics determined  by [facility]. This test has not been FDA cleared or  approved. This test has been authorized by FDA under an Emergency Use  Authorization (EUA). This test is only authorized for the duration of  time the declaration that circumstances exist justifying the  authorization of the emergency use of in vitro diagnostic tests for  detection of SARS-CoV-2 virus and/or diagnosis of COVID-19 infection  under section 564(b)(1) of the Act, 21 U.S.C. 360bbb-3(b)(1), unless  the authorization is terminated or revoked sooner.  When diagnostic testing is negative, the possibility of a false  negative result should be considered in the context of a patient's  recent exposures and the presence of clinical signs and symptoms  consistent with COVID-19. An individual without symptoms of COVID-19  and who is not shedding SARS-CoV-2 virus would expect  to have a  negative (not detected) result in this assay.       Assessment/Plan   Diagnoses and all orders for this visit:    1. Current mild episode of major depressive disorder, unspecified whether recurrent (CMS/HCC) (Primary)  -     buPROPion XL (Wellbutrin XL) 150 MG 24 hr tablet; Take 1 tablet by mouth Daily.  Dispense: 30 tablet; Refill: 0    2. Generalized anxiety disorder  -     sertraline (Zoloft) 100 MG tablet; Take 1.5 tablets by mouth Daily.  Dispense: 45 tablet; Refill: 2    3. Social anxiety disorder  -     sertraline (Zoloft) 100 MG tablet; Take 1.5 tablets by mouth Daily.  Dispense: 45 tablet; Refill: 2  -     hydrOXYzine (ATARAX) 10 MG tablet; Take 1 to 2 tablets PO daily prn panic  Dispense: 60 tablet; Refill: 5        Visit Diagnoses:    ICD-10-CM ICD-9-CM   1. Current mild episode of major depressive disorder, unspecified whether recurrent (CMS/HCC)  F32.0 296.21   2. Generalized anxiety disorder  F41.1 300.02   3. Social anxiety disorder  F40.10 300.23       Review:   I have reviewed the patient's previous medical records to include labs, radiology, notes and medications.     Impression:   -This is an initial evaluation.  Teodoro is endorsing some depressive symptoms.  He tells me his anxiety is well under control.  He is about to start graduate school and is working full-time.  -Initiate Wellbutrin  mg daily for depression.  I explained the purpose of this medication to Teodoro.  We discussed the risk versus benefits of adding this medication to his regiment, as well as potential side effects.  He verbalized understanding.  -Continue Zoloft 150 mg daily for social anxiety and anxiety.  -Continue Atarax 10 mg, patient may take 1 or 2 tablets daily as needed for social anxiety.  -Continue therapy with Yari Fang LCSW at [facility].    TREATMENT PLAN/GOALS: Continue supportive psychotherapy efforts and medications as indicated. Treatment and medication options discussed during today's visit.  Patient ackowledged and verbally consented to continue with current treatment plan and was educated on the importance of compliance with treatment and follow-up appointments.    MEDICATION ISSUES:    We discussed risks, benefits, and side effects of the above medications and the patient was agreeable with the plan. Patient was educated on the importance of compliance with treatment and follow-up appointments.  Patient is agreeable to call the office with any worsening of symptoms or onset of side effects. Patient is agreeable to call 911 or go to the nearest ER should he/she begin having SI/HI.      Counseled patient regarding multimodal approach with healthy nutrition, healthy sleep, regular physical activity, social activities, counseling, and medications.      Coping skills reviewed and encouraged positive framing of thoughts     Assisted patient in processing above session content; acknowledged and normalized patient’s thoughts, feelings, and concerns.  Applied  positive coping skills and behavior management in session.  Allowed patient to freely discuss issues without interruption or judgment. Provided safe, confidential environment to facilitate the development of positive therapeutic relationship and encourage open, honest communication. Assisted patient in identifying risk factors which would indicate the need for higher level of care including thoughts to harm self or others and/or self-harming behavior and encouraged patient to contact this office, call 911, or present to the nearest emergency room should any of these events occur. Discussed crisis intervention services and means to access.     MEDS ORDERED DURING VISIT:  New Medications Ordered This Visit   Medications   • buPROPion XL (Wellbutrin XL) 150 MG 24 hr tablet     Sig: Take 1 tablet by mouth Daily.     Dispense:  30 tablet     Refill:  0   • sertraline (Zoloft) 100 MG tablet     Sig: Take 1.5 tablets by mouth Daily.     Dispense:  45 tablet      Refill:  2   • hydrOXYzine (ATARAX) 10 MG tablet     Sig: Take 1 to 2 tablets PO daily prn panic     Dispense:  60 tablet     Refill:  5       No follow-ups on file.             This document has been electronically signed by ANEL Mcghee  December 14, 2020 10:26 EST    Please note that portions of this note were completed with a voice recognition program. Efforts were made to edit dictation, but occasionally words are mistranscribed.

## 2021-01-18 ENCOUNTER — OFFICE VISIT (OUTPATIENT)
Dept: PSYCHIATRY | Facility: CLINIC | Age: 26
End: 2021-01-18

## 2021-01-18 VITALS
SYSTOLIC BLOOD PRESSURE: 114 MMHG | WEIGHT: 234 LBS | HEIGHT: 73 IN | TEMPERATURE: 97.8 F | HEART RATE: 54 BPM | BODY MASS INDEX: 31.01 KG/M2 | RESPIRATION RATE: 18 BRPM | DIASTOLIC BLOOD PRESSURE: 68 MMHG

## 2021-01-18 DIAGNOSIS — F41.1 GENERALIZED ANXIETY DISORDER: ICD-10-CM

## 2021-01-18 DIAGNOSIS — F32.0 CURRENT MILD EPISODE OF MAJOR DEPRESSIVE DISORDER, UNSPECIFIED WHETHER RECURRENT (HCC): Primary | ICD-10-CM

## 2021-01-18 DIAGNOSIS — F40.10 SOCIAL ANXIETY DISORDER: ICD-10-CM

## 2021-01-18 PROCEDURE — 99212 OFFICE O/P EST SF 10 MIN: CPT | Performed by: NURSE PRACTITIONER

## 2021-01-18 RX ORDER — BUPROPION HYDROCHLORIDE 150 MG/1
150 TABLET ORAL DAILY
Qty: 30 TABLET | Refills: 0 | Status: SHIPPED | OUTPATIENT
Start: 2021-01-18 | End: 2021-02-17 | Stop reason: SDUPTHER

## 2021-01-18 NOTE — PROGRESS NOTES
"Chief Complaint  Depression and anxiety  Subjective          Teodoro Nina presents to [facility] by himself for a follow up and medication check.    History of Present Illness: Teodoro states, \" I can tell a big difference with the Wellbutrin.\"  Teodoro tells me that he has improved depressive symptoms and more motivation since his last appointment. Teodoro endorses some depressive symptoms such as depressed mood, anhedonia, and decreased concentration.  He tells me that \"overall his mood is stable.\"  Teodoro appears with a brighter disposition and more engaging mood.  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  He tells me he will only take two classes instead of the five he previously planned on taking.  He tells me he feels good about this decision.  He denies any problems with his former significant other and states, \"I have noticed that I stopped projecting my feelings onto him.\"  Teodoro's current medication regimen includes Wellbutrin XL, Atarax, and Zoloft.  He denies any side effects of his current medication regiment.  He denies any problems with sleep or appetite.  He denies any SI/HI/AVH.    Current Medications:   Current Outpatient Medications   Medication Sig Dispense Refill   • buPROPion XL (Wellbutrin XL) 150 MG 24 hr tablet Take 1 tablet by mouth Daily. 30 tablet 0   • hydrOXYzine (ATARAX) 10 MG tablet Take 1 to 2 tablets PO daily prn panic 60 tablet 5   • sertraline (Zoloft) 100 MG tablet Take 1.5 tablets by mouth Daily. 45 tablet 2     No current facility-administered medications for this visit.          Objective   Vital Signs:   /68 (BP Location: Left arm)   Pulse 54   Temp 97.8 °F (36.6 °C) (Infrared)   Resp 18   Ht 185.4 cm (73\")   Wt 106 kg (234 lb)   BMI 30.87 kg/m²     Physical Exam  Vitals signs and nursing note reviewed.   Constitutional:       Appearance: Normal appearance. He is well-developed.   Musculoskeletal: Normal range of motion. "   Skin:     General: Skin is warm and dry.   Neurological:      Mental Status: He is alert and oriented to person, place, and time.   Psychiatric:         Attention and Perception: Attention normal.         Mood and Affect: Mood normal.         Speech: Speech normal.         Behavior: Behavior normal. Behavior is cooperative.         Thought Content: Thought content normal.         Cognition and Memory: Cognition normal.         Judgment: Judgment normal.        Result Review :                   Assessment and Plan    Problem List Items Addressed This Visit     None      Visit Diagnoses     Current mild episode of major depressive disorder, unspecified whether recurrent (CMS/HCC)    -  Primary    Relevant Medications    buPROPion XL (Wellbutrin XL) 150 MG 24 hr tablet    Generalized anxiety disorder        Relevant Medications    buPROPion XL (Wellbutrin XL) 150 MG 24 hr tablet    Social anxiety disorder        Relevant Medications    buPROPion XL (Wellbutrin XL) 150 MG 24 hr tablet          Mental Status Exam:   Hygiene:   good  Cooperation:  Cooperative  Eye Contact:  Good  Psychomotor Behavior:  Appropriate  Affect:  Full range  Mood: normal  Speech:  Normal  Thought Process:  Goal directed and Linear  Thought Content:  Normal  Suicidal:  None  Homicidal:  None  Hallucinations:  None  Delusion:  None  Memory:  Intact  Orientation:  Person, Place, Time and Situation  Reliability:  good  Insight:  Good  Judgement:  Good  Impulse Control:  Good  Physical/Medical Issues:  No      PHQ-9 Score:   PHQ-9 Total Score: 3    Impression/Plan:  -This is a follow-up and medication check.  Teodoro is endorsing improved symptoms of depression and anxiety.  He feels his mood is stable and is having more motivation.  He will begin his classes next week.  -Continue Wellbutrin  mg daily for depression and anxiety.  -Continue Zoloft 150 mg daily for depression and anxiety.  -Continue Atarax 10 mg 3 times daily as needed for  anxiety.    MEDS ORDERED DURING VISIT:  New Medications Ordered This Visit   Medications   • buPROPion XL (Wellbutrin XL) 150 MG 24 hr tablet     Sig: Take 1 tablet by mouth Daily.     Dispense:  30 tablet     Refill:  0       I spent 10 minutes caring for Teodoro on this date of service. This time includes time spent by me in the following activities:preparing for the visit, performing a medically appropriate examination and/or evaluation , counseling and educating the patient/family/caregiver and documenting information in the medical record  Follow Up   Return in about 3 months (around 4/18/2021) for Medication Check.  Patient was given instructions and counseling regarding his condition or for health maintenance advice. Please see specific information pulled into the AVS if appropriate.       TREATMENT PLAN/GOALS: Continue supportive psychotherapy efforts and medications as indicated. Treatment and medication options discussed during today's visit. Patient acknowledged and verbally consented to continue with current treatment plan and was educated on the importance of compliance with treatment and follow-up appointments.    MEDICATION ISSUES:  Discussed medication options and treatment plan of prescribed medication as well as the risks, benefits, and side effects including potential falls, possible impaired driving and metabolic adversities among others. Patient is agreeable to call the office with any worsening of symptoms or onset of side effects. Patient is agreeable to call 911 or go to the nearest ER should he/she begin having SI/HI.        This document has been electronically signed by ANEL Bethea, PMHNP-BC  January 18, 2021 10:20 EST    Part of this note may be an electronic transcription/translation of spoken language to printed text using the Dragon Dictation System.

## 2021-01-27 ENCOUNTER — OFFICE VISIT (OUTPATIENT)
Dept: ORTHOPEDIC SURGERY | Facility: CLINIC | Age: 26
End: 2021-01-27

## 2021-01-27 VITALS — BODY MASS INDEX: 31.09 KG/M2 | TEMPERATURE: 96.9 F | HEIGHT: 73 IN | WEIGHT: 234.6 LBS

## 2021-01-27 DIAGNOSIS — M25.862 PATELLOFEMORAL DYSFUNCTION, LEFT: ICD-10-CM

## 2021-01-27 DIAGNOSIS — M22.42 PATELLA, CHONDROMALACIA, LEFT: ICD-10-CM

## 2021-01-27 DIAGNOSIS — M25.562 ARTHRALGIA OF LEFT KNEE: Primary | ICD-10-CM

## 2021-01-27 PROCEDURE — 99203 OFFICE O/P NEW LOW 30 MIN: CPT | Performed by: PHYSICIAN ASSISTANT

## 2021-01-27 NOTE — PROGRESS NOTES
Subjective   Patient ID: Teodoro Nina is a 25 y.o. male  Pain and Injury of the Left Knee (Referred by  for left knee pain since April. Patient states he was hiking, stopped and did some squats. He felt a sharp pain. 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 Has had pain daily since (mostly when working).  visit 1/25/21)         History of Present Illness  Patient presents as a new patient with complaints of left anterior knee pain that has been ongoing since April 2020.  He states he was hiking and perform some squats when he developed the sharp anterior knee pain.  He states since he has noticed pain after standing for long periods at a time.  He notices some swelling with stiffness.  He has tried oral analgesics without improvement.    Pain Score: 5  Pain Location: Knee  Pain Orientation: Left  Pain Radiating Towards: throbs in calf  Pain Descriptors: Aching, Throbbing, Discomfort  Pain Frequency: Constant/continuous  Pain Onset: Ongoing  Date Pain First Started: (April 2020)  Clinical Progression: Gradually worsening  Aggravating Factors: Bending, Standing, Squatting, Exercise        Pain Intervention(s): Home medication, Heat applied(ibuprofen, IcyHot)  Result of Injury: Yes(felt a sharp pain while hiking and doing squats)  Work-Related Injury: No    Past Medical History:   Diagnosis Date   • Anxiety         Past Surgical History:   Procedure Laterality Date   • ADENOIDECTOMY     • CHOLECYSTECTOMY     • TONSILLECTOMY         Family History   Problem Relation Age of Onset   • Anxiety disorder Mother    • Drug abuse Mother    • Bipolar disorder Father    • Depression Father    • Drug abuse Father    • Suicide Attempts Father    • Bipolar disorder Sister    • Drug abuse Sister    • ODD Nephew    • ADD / ADHD Neg Hx    • Alcohol abuse Neg Hx    • Dementia Neg Hx    • OCD Neg Hx    • Schizophrenia Neg Hx    • Paranoid behavior Neg Hx    • Self-Injurious Behavior  Neg Hx    • Seizures Neg Hx        Social History  "    Socioeconomic History   • Marital status: Single     Spouse name: Not on file   • Number of children: 0   • Years of education: Not on file   • Highest education level: Some college, no degree   Occupational History   • Occupation: gas sation     Employer: [employer]   Tobacco Use   • Smoking status: Former Smoker     Packs/day: 0.25     Types: Electronic Cigarette   • Smokeless tobacco: Never Used   Substance and Sexual Activity   • Alcohol use: Yes     Alcohol/week: 1.0 standard drinks     Types: 1 Shots of liquor per week     Comment: OCCASIONAL   • Drug use: No   • Sexual activity: Yes     Partners: Male     Birth control/protection: None   Social History Narrative    Right hand dominant         Current Outpatient Medications:   •  buPROPion XL (Wellbutrin XL) 150 MG 24 hr tablet, Take 1 tablet by mouth Daily., Disp: 30 tablet, Rfl: 0  •  hydrOXYzine (ATARAX) 10 MG tablet, Take 1 to 2 tablets PO daily prn panic, Disp: 60 tablet, Rfl: 5  •  sertraline (Zoloft) 100 MG tablet, Take 1.5 tablets by mouth Daily., Disp: 45 tablet, Rfl: 2    No Known Allergies    Review of Systems   Constitutional: Negative for fever.   HENT: Negative for dental problem and voice change.    Eyes: Negative for visual disturbance.   Respiratory: Negative for shortness of breath.    Cardiovascular: Negative for chest pain.   Gastrointestinal: Negative for abdominal pain.   Genitourinary: Negative for dysuria.   Musculoskeletal: Positive for arthralgias, gait problem (limps due to pain) and joint swelling.   Skin: Negative for rash.   Neurological: Negative for speech difficulty.   Hematological: Does not bruise/bleed easily.   Psychiatric/Behavioral: Negative for confusion.       I have reviewed the medical and surgical history, family history, social history, medications, and/or allergies, and the review of systems of this report.    Objective   Temp 96.9 °F (36.1 °C)   Ht 185.4 cm (73\")   Wt 106 kg (234 lb 9.6 oz)   BMI 30.95 kg/m²  "   Physical Exam  Vitals signs and nursing note reviewed.   Constitutional:       Appearance: Normal appearance.   Pulmonary:      Effort: Pulmonary effort is normal.   Musculoskeletal:      Left knee: He exhibits abnormal patellar mobility. He exhibits no swelling, no effusion, no erythema, no LCL laxity and no MCL laxity. Tenderness found.      Left ankle: He exhibits no swelling and no ecchymosis.   Neurological:      Mental Status: He is alert and oriented to person, place, and time.       Left Knee Exam     Range of Motion   Extension: 0   Flexion: 120     Tests   Oriana:  Medial - negative Lateral - negative  Patellar apprehension: trace    Other   Effusion: no effusion present           Extremity DVT signs are negative on physical exam with negative Paulo sign, no calf pain, no palpable cords and no skin tone change   Neurologic Exam     Mental Status   Oriented to person, place, and time.        Positive left knee patella crepitus.  Positive left knee Jero sign       Assessment/Plan   Independent Review of Radiographic Studies:    X-ray of the left knee sunrise view 1 view performed in the office for the evaluation of patella maltracking.  No comparison films available reviewed.  There does appear to be mild lateral patella tilt      Procedures       Diagnoses and all orders for this visit:    1. Arthralgia of left knee (Primary)  -     XR Knee 1 or 2 View Left  -     Ambulatory Referral to Physical Therapy Evaluate and treat, Ortho; Heat; Strengthening (quad and VMO program), ROM, Stretching    2. Patella, chondromalacia, left  -     Ambulatory Referral to Physical Therapy Evaluate and treat, Ortho; Heat; Strengthening (quad and VMO program), ROM, Stretching    3. Patellofemoral dysfunction, left  -     Ambulatory Referral to Physical Therapy Evaluate and treat, Ortho; Heat; Strengthening (quad and VMO program), ROM, Stretching       Orthopedic activities reviewed and patient expressed  appreciation  Discussion of orthopedic goals  Risk, benefits, and merits of treatment alternatives reviewed with the patient and questions answered    Recommendations/Plan:  Exercise, medications, injections, other patient advice, and return appointment as noted.  Patient is encouraged to call or return for any issues or concerns.    Patient was provided a patella maltracking brace.  Enroll in formal therapy.  Take over-the-counter ibuprofen as directed with food.  Follow-up in 8 to 12 weeks if no improvement consider MRI of the left knee  Patient agreeable to call or return sooner for any concerns.               EMR Dragon-transcription disclaimer:  This encounter note is an electronic transcription of spoken language to printed text.  Electronic transcription of spoken language may permit erroneous or at times nonsensical words or phrases to be inadvertently transcribed.  Although I have reviewed the note for such errors, some may still exist

## 2021-02-17 DIAGNOSIS — F32.0 CURRENT MILD EPISODE OF MAJOR DEPRESSIVE DISORDER, UNSPECIFIED WHETHER RECURRENT (HCC): ICD-10-CM

## 2021-02-17 RX ORDER — BUPROPION HYDROCHLORIDE 150 MG/1
150 TABLET ORAL DAILY
Qty: 30 TABLET | Refills: 2 | Status: SHIPPED | OUTPATIENT
Start: 2021-02-17 | End: 2021-04-19 | Stop reason: SDUPTHER

## 2021-03-01 ENCOUNTER — TREATMENT (OUTPATIENT)
Dept: PHYSICAL THERAPY | Facility: CLINIC | Age: 26
End: 2021-03-01

## 2021-03-01 DIAGNOSIS — M25.862 PATELLOFEMORAL DYSFUNCTION OF LEFT KNEE: ICD-10-CM

## 2021-03-01 DIAGNOSIS — M94.262 CHONDROMALACIA OF KNEE, LEFT: Primary | ICD-10-CM

## 2021-03-01 PROCEDURE — 97161 PT EVAL LOW COMPLEX 20 MIN: CPT | Performed by: PHYSICAL THERAPIST

## 2021-03-01 PROCEDURE — 97140 MANUAL THERAPY 1/> REGIONS: CPT | Performed by: PHYSICAL THERAPIST

## 2021-03-01 NOTE — PROGRESS NOTES
Physical Therapy Initial Evaluation and Plan of Care      Patient: Teodoro Nina   : 1995  Diagnosis/ICD-10 Code:  Chondromalacia of knee, left [M94.262]  Referring practitioner: SHAWNEE Rabago*    Subjective Evaluation    History of Present Illness  Mechanism of injury: Patient reports that he initially injured his L knee in 2020. He states that he hurt his knee while hiking. He states that he had constant pain for several months but that has improved. He only has pain now with increased activity. Patient reports he can stand for approx. 3-4 hours prior to requiring a seated rest break but states that he has knee pain within 5 minutes of standing. He states that he has no issues in a seated position. He reports he has no issues sleeping due to knee pain at this time.       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: good    Pain  Current pain ratin  At best pain ratin  At worst pain ratin  Quality: dull ache and grinding  Relieving factors: change in position, heat and medications (Patient reports that he took a steriod pack a couple weeks ago and it has really helped his pain )  Aggravating factors: standing, ambulation, prolonged positioning, squatting, stairs and repetitive movement  Progression: improved    Social Support  Lives in: one-story house    Diagnostic Tests  X-ray: normal    Treatments  No previous or current treatments  Patient Goals  Patient goals for therapy: decreased pain, increased strength, independence with ADLs/IADLs, decreased edema, increased motion and return to sport/leisure activities             Objective          Tenderness   Left Knee   Tenderness in the patellar tendon, quadriceps tendon and tibial tubercle.     Neurological Testing     Sensation     Knee   Left Knee   Intact: light touch    Right Knee   Intact: light touch     Reflexes   Left   Patellar (L4): normal (2+)  Achilles (S1): normal (2+)    Right   Patellar (L4): normal  (2+)  Achilles (S1): normal (2+)    Additional Neurological Details  Sensation is intact and equal on B LE.     Active Range of Motion   Left Knee   Flexion: 133 degrees   Extension: 0 degrees     Right Knee   Flexion: 135 degrees   Extension: 0 degrees     Patellar Mobility   Left Knee Patellar tendons within functional limits include the medial and inferior. Hypomobile in the left lateral and left superior patellar tendon(s).     Right Knee Patellar tendons within functional limits include the medial, lateral, superior and inferior.     Strength/Myotome Testing     Left Hip   Planes of Motion   Flexion: 4-  Abduction: 4  Adduction: 4    Right Hip   Planes of Motion   Flexion: 4+  Abduction: 4+  Adduction: 4+    Left Knee   Flexion: 4-  Extension: 4-  Quadriceps contraction: fair    Right Knee   Flexion: 4+  Extension: 4+  Quadriceps contraction: good    Tests     Left Knee   Positive patella-femoral grind.   Negative patellar apprehension, Thessaly's test at 5 degrees, Thessaly's test at 20 degrees, valgus stress test at 0 degrees, valgus stress test at 30 degrees, varus stress test at 0 degrees and varus stress test at 30 degrees.      General Comments     Knee Comments  Patient walks with normal gait pattern. Mild valgus positioning is noted in bilateral knees during ambulation.          Assessment & Plan     Assessment  Impairments: abnormal muscle firing, abnormal or restricted ROM, activity intolerance, impaired physical strength, lacks appropriate home exercise program, pain with function and weight-bearing intolerance  Assessment details: Patient is a 25 year old male who comes to physical therapy with complaints of L knee pain. Signs and symptoms are consistent with patellofemoral dysfunction. The patient currently has pain, decreased ROM, decreased strength, and inability to perform many essential functional activities. Pt will benefit from skilled PT services to address the above issues.     Prognosis:  good  Functional Limitations: walking, uncomfortable because of pain, standing, stooping and unable to perform repetitive tasks  Goals  Plan Goals: SHORT TERM GOALS:  2 weeks       1. Pt independent with HEP  2. Pt to demonstrate davis hip strength 4/5 or greater to improve stability with ambulation  3. Pt to report being able to walk for 10 minutes without increasing pain in the left knee    LONG TERM GOALS:   6 weeks  1. Pt to demonstrate ability to perform full functional squat with good form and control of the knees and without increasing pain  2. Pt to demonstrate davis hip strength to 4+/5 or greater to improve safety with ambulation on uneven surfaces  3. Pt to demonstrate ability to perform step up/down 8 inch step x10 safely and without pain in the left knee       Plan  Therapy options: will be seen for skilled physical therapy services  Planned modality interventions: cryotherapy  Planned therapy interventions: balance/weight-bearing training, home exercise program, joint mobilization, therapeutic activities, strengthening, soft tissue mobilization, neuromuscular re-education and manual therapy  Frequency: 2x week  Duration in weeks: 6  Treatment plan discussed with: patient        Manual Therapy:    8     mins  09107;  Therapeutic Exercise:    6     mins  66808;     Neuromuscular Aung:        mins  08225;    Therapeutic Activity:          mins  27584;     Gait Training:           mins  49291;     Ultrasound:          mins  03819;    Electrical Stimulation:         mins  75462 ( );  Dry Needling          mins self-pay    Timed Treatment:   14   mins   Total Treatment:     41   mins    PT SIGNATURE: Reba Salas PT   DATE TREATMENT INITIATED: 3/1/2021    Initial Certification  Certification Period: 5/30/2021  I certify that the therapy services are furnished while this patient is under my care.  The services outlined above are required by this patient, and will be reviewed every 90 days.     PHYSICIAN:  Maykel Dye PA-C      DATE:     Please sign and return via fax to [phone].. Thank you, [facility].

## 2021-03-04 ENCOUNTER — TREATMENT (OUTPATIENT)
Dept: PHYSICAL THERAPY | Facility: CLINIC | Age: 26
End: 2021-03-04

## 2021-03-04 DIAGNOSIS — M25.862 PATELLOFEMORAL DYSFUNCTION OF LEFT KNEE: ICD-10-CM

## 2021-03-04 DIAGNOSIS — M94.262 CHONDROMALACIA OF KNEE, LEFT: Primary | ICD-10-CM

## 2021-03-04 PROCEDURE — 97140 MANUAL THERAPY 1/> REGIONS: CPT | Performed by: PHYSICAL THERAPIST

## 2021-03-04 PROCEDURE — 97110 THERAPEUTIC EXERCISES: CPT | Performed by: PHYSICAL THERAPIST

## 2021-03-04 NOTE — PROGRESS NOTES
Physical Therapy Daily Progress Note    Patient Information  Teodroo Nina  1995      Visit # : 2    Teodoro Nina reports 7/10 pain today at rest.  Patient reports that his knee is fairly painful today. He states he just left work and it was very busy. He states that the exercises are going well at home but admits he needs to ice his knee more frequently.         Objective Pt presents to PT today with no distress noted at rest.     Patient has tenderness to palpation on L patellar tendon.    Patient has moderate tightness in L gastroc and hamstring.    Patient with mild tenderness over L hamstring insertion.       See Exercise, Manual, and Modality Logs for complete treatment.     Assessment/Plan  Patient tolerated session fair with moderate increases in pain with exercises. Patient continues to demonstrate good ROM in L knee but does have reports of pain with knee flexion. Patient reported a decrease in pain following session 5/10. Patient was able to demonstrate HEP at start of session.       Progress per Plan of Care  PT will continue to monitor patients signs and symptoms and progress patient as tolerated.     Visit Diagnoses:    ICD-10-CM ICD-9-CM   1. Chondromalacia of knee, left  M94.262 717.7   2. Patellofemoral dysfunction of left knee  M25.862 719.86            Manual Therapy:    12     mins  85113;  Therapeutic Exercise:    26     mins  26542;     Neuromuscular Aung:        mins  33554;    Therapeutic Activity:          mins  01956;     Gait Training:           mins  67746;     Ultrasound:          mins  24357;    Electrical Stimulation:         mins  90674 ( );  Dry Needling          mins self-pay    Timed Treatment:   38   mins   Total Treatment:     48   mins          Reba Salas PT  Physical Therapist

## 2021-03-09 ENCOUNTER — TREATMENT (OUTPATIENT)
Dept: PHYSICAL THERAPY | Facility: CLINIC | Age: 26
End: 2021-03-09

## 2021-03-09 DIAGNOSIS — M25.862 PATELLOFEMORAL DYSFUNCTION OF LEFT KNEE: ICD-10-CM

## 2021-03-09 DIAGNOSIS — M94.262 CHONDROMALACIA OF KNEE, LEFT: Primary | ICD-10-CM

## 2021-03-09 PROCEDURE — 97140 MANUAL THERAPY 1/> REGIONS: CPT | Performed by: PHYSICAL THERAPIST

## 2021-03-09 PROCEDURE — 97110 THERAPEUTIC EXERCISES: CPT | Performed by: PHYSICAL THERAPIST

## 2021-03-09 NOTE — PROGRESS NOTES
Physical Therapy Daily Progress Note    Patient Information  Teodoro Nina  1995      Visit # : 3    Teodoro Nina reports 3/10 pain today at rest.  Patient reports that he did not work today and states that his knee is usually less painful on these days. He states that he has tried to do his HEP as prescribed, however, he has slacked some the last few days.         Objective Pt presents to PT today with no distress noted at rest.     Patient has mild tenderness over L hamstring insertion.     Patient has mild tenderness along lateral border of L knee and patellar tendon.       See Exercise, Manual, and Modality Logs for complete treatment.     Assessment/Plan  Patient tolerated session well with no increases in pain with exercises. Patient had reports of mild discomfort during manual therapy but had reports of decreased pain following manual therapy. Patient ambulates in clinic with normal gait pattern.      Progress per Plan of Care  PT will continue to monitor patients signs and symptoms and progress patient as tolerated.     Visit Diagnoses:    ICD-10-CM ICD-9-CM   1. Chondromalacia of knee, left  M94.262 717.7   2. Patellofemoral dysfunction of left knee  M25.862 719.86            Manual Therapy:    14     mins  08113;  Therapeutic Exercise:    27     mins  40346;     Neuromuscular Aung:        mins  15988;    Therapeutic Activity:          mins  98894;     Gait Training:           mins  22920;     Ultrasound:          mins  84316;    Electrical Stimulation:         mins  18047 ( );  Dry Needling          mins self-pay    Timed Treatment:   41   mins   Total Treatment:     60   mins          Reba Salas, PT  Physical Therapist

## 2021-03-11 ENCOUNTER — TREATMENT (OUTPATIENT)
Dept: PHYSICAL THERAPY | Facility: CLINIC | Age: 26
End: 2021-03-11

## 2021-03-11 DIAGNOSIS — M94.262 CHONDROMALACIA OF KNEE, LEFT: Primary | ICD-10-CM

## 2021-03-11 DIAGNOSIS — M25.862 PATELLOFEMORAL DYSFUNCTION OF LEFT KNEE: ICD-10-CM

## 2021-03-11 PROCEDURE — 97140 MANUAL THERAPY 1/> REGIONS: CPT | Performed by: PHYSICAL THERAPIST

## 2021-03-11 PROCEDURE — 97110 THERAPEUTIC EXERCISES: CPT | Performed by: PHYSICAL THERAPIST

## 2021-03-11 NOTE — PROGRESS NOTES
Physical Therapy Daily Progress Note    Patient Information  Teodoro Nina  1995      Visit # : 4    Teodoro Nina reports 4.5/10 pain today at rest.  Patient reports that he worked today so his knee is a little more irritated. He states that he never has knee pain when he first wakes up but it begins as the day progresses.          Objective Pt presents to PT today with no distress noted at rest.    Patient has moderate tenderness over L patellar tendon.    Patient with mild tenderness along lateral border of L patella.     Patient continues to demonstrate full L knee ROM.      See Exercise, Manual, and Modality Logs for complete treatment.     Assessment/Plan  Patient tolerated session well with no increases in pain throughout session. Patient demonstrates fatigue with some exercises and requires a brief rest break to resolve symptoms today. Patient was encouraged to utilize HEP more frequently at home along with ice to assist in management of L knee symptoms. Patient reported a decrease in pain following session 2/10.      Progress per Plan of Care  PT will continue to monitor patients signs and symptoms and progress patient as tolerated.     Visit Diagnoses:    ICD-10-CM ICD-9-CM   1. Chondromalacia of knee, left  M94.262 717.7   2. Patellofemoral dysfunction of left knee  M25.862 719.86            Manual Therapy:    15     mins  42196;  Therapeutic Exercise:    26     mins  62402;     Neuromuscular Aung:        mins  26454;    Therapeutic Activity:          mins  24492;     Gait Training:           mins  74519;     Ultrasound:          mins  53609;    Electrical Stimulation:         mins  63288 ( );  Dry Needling          mins self-pay    Timed Treatment:   41   mins   Total Treatment:     54   mins          Reba Salas PT  Physical Therapist

## 2021-03-16 ENCOUNTER — TREATMENT (OUTPATIENT)
Dept: PHYSICAL THERAPY | Facility: CLINIC | Age: 26
End: 2021-03-16

## 2021-03-16 DIAGNOSIS — M94.262 CHONDROMALACIA OF KNEE, LEFT: Primary | ICD-10-CM

## 2021-03-16 DIAGNOSIS — M25.862 PATELLOFEMORAL DYSFUNCTION OF LEFT KNEE: ICD-10-CM

## 2021-03-16 PROCEDURE — 97140 MANUAL THERAPY 1/> REGIONS: CPT | Performed by: PHYSICAL THERAPIST

## 2021-03-16 PROCEDURE — 97110 THERAPEUTIC EXERCISES: CPT | Performed by: PHYSICAL THERAPIST

## 2021-03-16 NOTE — PROGRESS NOTES
Physical Therapy Daily Progress Note    Patient Information  Teodoro Nina  1995      Visit # : 5    Teodoro Nina reports 5/10 pain today at rest.  Patient reports that he had very little pain over the weekend. He states today was his first day back to work since Thursday and he reports that his knee is fairly irritated. Patient reports that he feels like his knee has improved but work seems to be the biggest thing that irritates it.        Objective Pt presents to PT today with no distress noted at rest.    Patient has mild-moderate tenderness in L patellar tendon.    Patient with mild tenderness along lateral border of L patella.       See Exercise, Manual, and Modality Logs for complete treatment.     Assessment/Plan  Patient tolerated session well with no increases in pain throughout session. Patient continues to have increases in L knee pain when he is more active but reports this is improving somewhat. Patient continues to have tenderness in the patellar tendon. Patient was educated on the importance of avoiding knee hyperextension while standing. Patient was encouraged to continue HEP and utilize ice to manage knee symptoms at home.       Progress per Plan of Care  PT will continue to monitor patients signs and symptoms and progress patient as tolerated.     Visit Diagnoses:    ICD-10-CM ICD-9-CM   1. Chondromalacia of knee, left  M94.262 717.7   2. Patellofemoral dysfunction of left knee  M25.862 719.86            Manual Therapy:    13     mins  80028;  Therapeutic Exercise:    28     mins  52919;     Neuromuscular Aung:        mins  83470;    Therapeutic Activity:          mins  87474;     Gait Training:           mins  80823;     Ultrasound:          mins  64705;    Electrical Stimulation:         mins  64945 ( );  Dry Needling          mins self-pay    Timed Treatment:   41   mins   Total Treatment:     56   mins          Reba Salas PT  Physical Therapist

## 2021-03-23 ENCOUNTER — TREATMENT (OUTPATIENT)
Dept: PHYSICAL THERAPY | Facility: CLINIC | Age: 26
End: 2021-03-23

## 2021-03-23 DIAGNOSIS — M94.262 CHONDROMALACIA OF KNEE, LEFT: Primary | ICD-10-CM

## 2021-03-23 DIAGNOSIS — M25.862 PATELLOFEMORAL DYSFUNCTION OF LEFT KNEE: ICD-10-CM

## 2021-03-23 PROCEDURE — 97110 THERAPEUTIC EXERCISES: CPT | Performed by: PHYSICAL THERAPIST

## 2021-03-23 PROCEDURE — 97140 MANUAL THERAPY 1/> REGIONS: CPT | Performed by: PHYSICAL THERAPIST

## 2021-03-23 NOTE — PROGRESS NOTES
Physical Therapy Daily Progress Note    Patient Information  Teodoro Nina  1995      Visit # : 6    Teodoro Nina reports 2/10 pain today at rest.  Patient reports that he worked today and his knee gave him very little issues while at work. He states that he has been trying to avoid hyperextending his knees as much as he can. He states it has been a hard habit to break.        Objective Pt presents to PT today with no distress noted at rest.     Patient with mild tenderness along the lateral border of L patella.    Patient with mild tenderness over patellar tendon.      See Exercise, Manual, and Modality Logs for complete treatment.     Assessment/Plan  Patient tolerated session well with no increase in symptoms throughout session. Patient continues to demonstrate hypermobility in B knees. Patient is more aware of this hypermobility and is limiting hyperextension in B knees while in clinic without cueing. Patient is able to demonstrate a good quad contraction.      Progress per Plan of Care  PT will continue to monitor patients signs and symptoms and progress patient as tolerated.     Visit Diagnoses:    ICD-10-CM ICD-9-CM   1. Chondromalacia of knee, left  M94.262 717.7   2. Patellofemoral dysfunction of left knee  M25.862 719.86            Manual Therapy:    12     mins  81491;  Therapeutic Exercise:    28     mins  01512;     Neuromuscular Aung:        mins  45088;    Therapeutic Activity:          mins  73653;     Gait Training:           mins  15803;     Ultrasound:          mins  63275;    Electrical Stimulation:         mins  40789 ( );  Dry Needling          mins self-pay    Timed Treatment:   40   mins   Total Treatment:     54   mins          Reba Salas PT  Physical Therapist

## 2021-03-25 ENCOUNTER — TREATMENT (OUTPATIENT)
Dept: PHYSICAL THERAPY | Facility: CLINIC | Age: 26
End: 2021-03-25

## 2021-03-25 DIAGNOSIS — M25.862 PATELLOFEMORAL DYSFUNCTION OF LEFT KNEE: ICD-10-CM

## 2021-03-25 DIAGNOSIS — M94.262 CHONDROMALACIA OF KNEE, LEFT: Primary | ICD-10-CM

## 2021-03-25 PROCEDURE — 97110 THERAPEUTIC EXERCISES: CPT | Performed by: PHYSICAL THERAPIST

## 2021-03-25 PROCEDURE — 97140 MANUAL THERAPY 1/> REGIONS: CPT | Performed by: PHYSICAL THERAPIST

## 2021-03-25 NOTE — PROGRESS NOTES
Physical Therapy Daily Progress Note    Patient Information  Teodoro Nina  1995      Visit # : 7    Teodoro Nina reports 0/10 pain today at rest.  Patient reports that his knee is much better. He states that he has worked all week and has no pain. He states that he continues to have mild pain when he works but states that it is much improved.         Objective Pt presents to PT today with no distress noted at rest.     Patient with mild tenderness to palpation along the lateral aspect of L patella.    Patient ambulating well in clinic with no antalgic gait pattern present.      See Exercise, Manual, and Modality Logs for complete treatment.     Assessment/Plan  Patient tolerated session well with no pain present throughout session. Patient has reports of improved knee pain at work and is able to be more active. Patient continues to demonstrate full L knee ROM. Patients tenderness to palpation is improved today.       Progress per Plan of Care  PT will continue to monitor patients signs and symptoms and progress patient as tolerated.    Visit Diagnoses:    ICD-10-CM ICD-9-CM   1. Chondromalacia of knee, left  M94.262 717.7   2. Patellofemoral dysfunction of left knee  M25.862 719.86            Manual Therapy:    10     mins  42095;  Therapeutic Exercise:    28     mins  53065;     Neuromuscular Aung:        mins  89894;    Therapeutic Activity:          mins  95669;     Gait Training:           mins  59063;     Ultrasound:          mins  90987;    Electrical Stimulation:         mins  86914 ( );  Dry Needling          mins self-pay    Timed Treatment:   38   mins   Total Treatment:     48   mins          Reba Salas, PT  Physical Therapist

## 2021-03-30 ENCOUNTER — TREATMENT (OUTPATIENT)
Dept: PHYSICAL THERAPY | Facility: CLINIC | Age: 26
End: 2021-03-30

## 2021-03-30 DIAGNOSIS — M94.262 CHONDROMALACIA OF KNEE, LEFT: Primary | ICD-10-CM

## 2021-03-30 DIAGNOSIS — M25.862 PATELLOFEMORAL DYSFUNCTION OF LEFT KNEE: ICD-10-CM

## 2021-03-30 PROCEDURE — 97110 THERAPEUTIC EXERCISES: CPT | Performed by: PHYSICAL THERAPIST

## 2021-03-30 PROCEDURE — 97140 MANUAL THERAPY 1/> REGIONS: CPT | Performed by: PHYSICAL THERAPIST

## 2021-03-30 NOTE — PROGRESS NOTES
Physical Therapy Daily Progress Note    Patient Information  Teodoro Nina  1995      Visit # : 8    Teodoro Nina reports 0/10 pain today at rest.  Patient reports that he went hiking over the weekend and his knee only hurt when walking down hill. He states that his knee has been less painful at work too.        Objective Pt presents to PT today with no distress noted at rest.    Patient with no tenderness to palpation over lateral aspect of L patella.    Patient with very mild tenderness to palpation over L patella tendon.    Patient continues to demonstrate full L knee ROM.    See Exercise, Manual, and Modality Logs for complete treatment.     Assessment/Plan  Patient tolerated session well with no pain present throughout session.       Progress per Plan of Care  PT will continue to monitor patients signs and symptoms and progress patient as tolerated. Patient continues to ambulate well in clinic and has less tenderness in L knee today. Patient reports that work is not as painful on his knee. Patient encouraged to continue HEP at home.     Visit Diagnoses:    ICD-10-CM ICD-9-CM   1. Chondromalacia of knee, left  M94.262 717.7   2. Patellofemoral dysfunction of left knee  M25.862 719.86            Manual Therapy:    9     mins  93124;  Therapeutic Exercise:    28     mins  97692;     Neuromuscular Aung:        mins  56283;    Therapeutic Activity:     6     mins  02686;     Gait Training:           mins  89537;     Ultrasound:          mins  36273;    Electrical Stimulation:         mins  39153 ( );  Dry Needling          mins self-pay    Timed Treatment:   43   mins   Total Treatment:     56   mins          Reba Salas, PT  Physical Therapist

## 2021-04-01 ENCOUNTER — TREATMENT (OUTPATIENT)
Dept: PHYSICAL THERAPY | Facility: CLINIC | Age: 26
End: 2021-04-01

## 2021-04-01 DIAGNOSIS — M94.262 CHONDROMALACIA OF KNEE, LEFT: Primary | ICD-10-CM

## 2021-04-01 DIAGNOSIS — M25.862 PATELLOFEMORAL DYSFUNCTION OF LEFT KNEE: ICD-10-CM

## 2021-04-01 PROCEDURE — 97110 THERAPEUTIC EXERCISES: CPT | Performed by: PHYSICAL THERAPIST

## 2021-04-01 PROCEDURE — 97140 MANUAL THERAPY 1/> REGIONS: CPT | Performed by: PHYSICAL THERAPIST

## 2021-04-01 NOTE — PROGRESS NOTES
Physical Therapy Daily Progress Note    Patient Information  Teodoro Nina  1995      Visit # : 9    Teodoro Nina reports 2/10 pain today at rest.  Patient reports that his L knee is mildly irritated today. He states that he worked today and it was a busier day. He states that he was on his feet for the majority of his shift.        Objective Pt presents to PT today with not distress noted at rest.    Patient has mild tenderness to palpation along the medial, lateral, and inferior patella.    Patient has mild tenderness to palpation in patellar tendon.    Patient demonstrates full L knee ROM and is doing well avoiding hyperextension in standing.      See Exercise, Manual, and Modality Logs for complete treatment.     Assessment/Plan  Patient tolerated session well with no increase in pain throughout session. Patient continues to demonstrate good awareness of knee positioning. He is better at avoiding hyperextension of knee in standing. Patient had reports of decreased pain following session. 0/10      Progress per Plan of Care   PT will continue to monitor patients signs and symptoms and progress patient as tolerated.    Visit Diagnoses:    ICD-10-CM ICD-9-CM   1. Chondromalacia of knee, left  M94.262 717.7   2. Patellofemoral dysfunction of left knee  M25.862 719.86            Manual Therapy:    9     mins  55255;  Therapeutic Exercise:    28     mins  67182;     Neuromuscular Aung:        mins  84102;    Therapeutic Activity:     7     mins  68381;     Gait Training:           mins  82858;     Ultrasound:          mins  49565;    Electrical Stimulation:         mins  68481 ( );  Dry Needling          mins self-pay    Timed Treatment:   44   mins   Total Treatment:     58   mins          Reba Salas PT  Physical Therapist

## 2021-04-06 DIAGNOSIS — F41.1 GENERALIZED ANXIETY DISORDER: ICD-10-CM

## 2021-04-06 DIAGNOSIS — F40.10 SOCIAL ANXIETY DISORDER: ICD-10-CM

## 2021-04-06 RX ORDER — SERTRALINE HYDROCHLORIDE 100 MG/1
150 TABLET, FILM COATED ORAL DAILY
Qty: 45 TABLET | Refills: 2 | Status: CANCELLED | OUTPATIENT
Start: 2021-04-06

## 2021-04-07 DIAGNOSIS — F40.10 SOCIAL ANXIETY DISORDER: ICD-10-CM

## 2021-04-07 DIAGNOSIS — F41.1 GENERALIZED ANXIETY DISORDER: ICD-10-CM

## 2021-04-07 RX ORDER — SERTRALINE HYDROCHLORIDE 100 MG/1
150 TABLET, FILM COATED ORAL DAILY
Qty: 45 TABLET | Refills: 2 | Status: CANCELLED | OUTPATIENT
Start: 2021-04-07

## 2021-04-08 ENCOUNTER — TREATMENT (OUTPATIENT)
Dept: PHYSICAL THERAPY | Facility: CLINIC | Age: 26
End: 2021-04-08

## 2021-04-08 DIAGNOSIS — M25.862 PATELLOFEMORAL DYSFUNCTION OF LEFT KNEE: ICD-10-CM

## 2021-04-08 DIAGNOSIS — M94.262 CHONDROMALACIA OF KNEE, LEFT: Primary | ICD-10-CM

## 2021-04-08 PROCEDURE — 97110 THERAPEUTIC EXERCISES: CPT | Performed by: PHYSICAL THERAPIST

## 2021-04-08 PROCEDURE — 97140 MANUAL THERAPY 1/> REGIONS: CPT | Performed by: PHYSICAL THERAPIST

## 2021-04-08 NOTE — PROGRESS NOTES
Physical Therapy Daily Progress Note    Patient Information  Teodoro Nina  1995      Visit # : 10    Teodoro Nina reports 4/10 pain today at rest.  Patient reports that earlier in the week he worked then went walking after work and had no knee pain. He states that his knee has bothered him some today and he is unsure why because it had not been hurting. He states that he did work today.         Objective Pt presents to PT today with no distress noted at rest.     Patient with very mild tenderness along patellar tendon.    Patient with mild tenderness on medial and lateral aspect of L patella.     See Exercise, Manual, and Modality Logs for complete treatment.     Assessment/Plan  Patient tolerated session well with no increase in knee pain with exercises. Patient has mild tenderness to palpation in L knee. He tolerated manual well with only mild reports of discomfort. He had reports of decreased pain following session 2/10.      Progress per Plan of Care  PT will continue to monitor patients signs and symptoms and progress patient as tolerated.     Visit Diagnoses:    ICD-10-CM ICD-9-CM   1. Chondromalacia of knee, left  M94.262 717.7   2. Patellofemoral dysfunction of left knee  M25.862 719.86            Manual Therapy:    10     mins  91665;  Therapeutic Exercise:    28     mins  40512;     Neuromuscular Aung:        mins  33206;    Therapeutic Activity:     6     mins  93865;     Gait Training:           mins  74179;     Ultrasound:          mins  64583;    Electrical Stimulation:         mins  25718 ( );  Dry Needling          mins self-pay    Timed Treatment:   44   mins   Total Treatment:     56   mins          Reba Salas PT  Physical Therapist

## 2021-04-13 ENCOUNTER — TREATMENT (OUTPATIENT)
Dept: PHYSICAL THERAPY | Facility: CLINIC | Age: 26
End: 2021-04-13

## 2021-04-13 DIAGNOSIS — M94.262 CHONDROMALACIA OF KNEE, LEFT: Primary | ICD-10-CM

## 2021-04-13 DIAGNOSIS — M25.862 PATELLOFEMORAL DYSFUNCTION OF LEFT KNEE: ICD-10-CM

## 2021-04-13 PROCEDURE — 97140 MANUAL THERAPY 1/> REGIONS: CPT | Performed by: PHYSICAL THERAPIST

## 2021-04-13 PROCEDURE — 97110 THERAPEUTIC EXERCISES: CPT | Performed by: PHYSICAL THERAPIST

## 2021-04-13 NOTE — PROGRESS NOTES
Physical Therapy Daily Progress Note    Patient Information  Teodoro Nina  1995      Visit # : 11    Teodoro Nina reports 2/10 pain today at rest.  Patient reports that his knee has been feeling better. He states that he has been working a lot and walking after work. He states that he has not been able to walk as much as he has the last little bit.        Objective Pt presents to PT today with no distress noted at rest.     Patient with mild tenderness along lateral aspect of L knee.     Patient with mild tenderness along L patellar tendon.       See Exercise, Manual, and Modality Logs for complete treatment.     Assessment/Plan  Patient tolerated session well with no increases in L knee pain with exercises. Patient continues to have mild tenderness in L knee but does seem to be improving. Patient tolerated manual therapy well with no reports of discomfort. Discussed with patient about current status and patient was agreeable to decreasing therapy 1x per week.       Progress per Plan of Care  PT will continue to monitor patients signs and symptoms and progress patient as tolerated.     Visit Diagnoses:    ICD-10-CM ICD-9-CM   1. Chondromalacia of knee, left  M94.262 717.7   2. Patellofemoral dysfunction of left knee  M25.862 719.86            Manual Therapy:    8     mins  92487;  Therapeutic Exercise:    28     mins  64938;     Neuromuscular Aung:        mins  61650;    Therapeutic Activity:     6     mins  08207;     Gait Training:           mins  04347;     Ultrasound:          mins  56307;    Electrical Stimulation:         mins  00253 ( );  Dry Needling          mins self-pay    Timed Treatment:   42   mins   Total Treatment:     54   mins          Reba Salas PT  Physical Therapist

## 2021-04-14 DIAGNOSIS — F40.10 SOCIAL ANXIETY DISORDER: ICD-10-CM

## 2021-04-14 DIAGNOSIS — F41.1 GENERALIZED ANXIETY DISORDER: ICD-10-CM

## 2021-04-14 RX ORDER — SERTRALINE HYDROCHLORIDE 100 MG/1
150 TABLET, FILM COATED ORAL DAILY
Qty: 45 TABLET | Refills: 2 | Status: SHIPPED | OUTPATIENT
Start: 2021-04-14 | End: 2021-08-03 | Stop reason: SDUPTHER

## 2021-04-19 ENCOUNTER — OFFICE VISIT (OUTPATIENT)
Dept: PSYCHIATRY | Facility: CLINIC | Age: 26
End: 2021-04-19

## 2021-04-19 VITALS — HEIGHT: 73 IN | WEIGHT: 234 LBS | BODY MASS INDEX: 31.01 KG/M2

## 2021-04-19 DIAGNOSIS — F41.1 GENERALIZED ANXIETY DISORDER: Primary | ICD-10-CM

## 2021-04-19 DIAGNOSIS — F32.0 CURRENT MILD EPISODE OF MAJOR DEPRESSIVE DISORDER, UNSPECIFIED WHETHER RECURRENT (HCC): ICD-10-CM

## 2021-04-19 DIAGNOSIS — F40.10 SOCIAL ANXIETY DISORDER: ICD-10-CM

## 2021-04-19 PROCEDURE — 99212 OFFICE O/P EST SF 10 MIN: CPT | Performed by: NURSE PRACTITIONER

## 2021-04-19 RX ORDER — BUPROPION HYDROCHLORIDE 150 MG/1
150 TABLET ORAL DAILY
Qty: 30 TABLET | Refills: 2 | Status: SHIPPED | OUTPATIENT
Start: 2021-04-19 | End: 2021-08-03 | Stop reason: SDUPTHER

## 2021-04-19 NOTE — PROGRESS NOTES
"Chief Complaint  Depression, anxiety, and social anxiety      Subjective          Teodoro Nina presents to [facility] by himself for a follow up and medication check.    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  He is about to start his summer classes and will take 2 classes this semester.  Teodoro has dropped his work schedule down to 32 hours at [employer].  He also added a roommate to help with finances.  His new roommate is his best friend and he tells me that things are going well.  Teodoro feels his depressive symptoms have improved greatly.  He rates his depression a 2 out of 10 most days over the last several weeks with 10 being the most severe.  He rates his anxiety a 3 out of 10 over the last several weeks with 10 being the most severe.  Teodoro continues to endorse symptoms of social anxiety and states, \"if something happens at work, then I just keep replaying it over and over.\"  Staff tells me he is having a difficult time letting situations go.  He tells me he even tries to walk and meditate.  Today, Teodoro and I discussed adding therapy to his treatment regiment.  He is in agreement to this.  His current medication regimen includes Wellbutrin XL, Zoloft, and hydroxyzine.  He denies any side effects of his current medication regiment.  He denies any problems with sleep or appetite.  He denies any SI/HI/AVH.    Current Medications:   Current Outpatient Medications   Medication Sig Dispense Refill   • buPROPion XL (Wellbutrin XL) 150 MG 24 hr tablet Take 1 tablet by mouth Daily. 30 tablet 2   • hydrOXYzine (ATARAX) 10 MG tablet Take 1 to 2 tablets PO daily prn panic 60 tablet 5   • sertraline (Zoloft) 100 MG tablet Take 1.5 tablets by mouth Daily. 45 tablet 2     No current facility-administered medications for this visit.         Objective   Vital Signs:   Ht 185.4 cm (73\")   Wt 106 kg (234 " lb)   BMI 30.87 kg/m²     Physical Exam  Vitals and nursing note reviewed.   Constitutional:       Appearance: Normal appearance. He is well-developed.   Musculoskeletal:         General: Normal range of motion.   Skin:     General: Skin is warm and dry.   Neurological:      Mental Status: He is alert and oriented to person, place, and time.   Psychiatric:         Attention and Perception: Attention normal.         Mood and Affect: Mood normal.         Speech: Speech normal.         Behavior: Behavior normal. Behavior is cooperative.         Thought Content: Thought content normal.         Cognition and Memory: Cognition normal.         Judgment: Judgment normal.        Result Review :     The following data was reviewed by: ANEL Mcghee on 04/19/2021:    Office Visit with Maykel Dye PA-C (01/27/2021)  [facility] with Brii Galicia APRN (01/25/2021)           Assessment and Plan    Problem List Items Addressed This Visit     None      Visit Diagnoses     Generalized anxiety disorder    -  Primary    Relevant Medications    buPROPion XL (Wellbutrin XL) 150 MG 24 hr tablet    Current mild episode of major depressive disorder, unspecified whether recurrent (CMS/HCC)        Relevant Medications    buPROPion XL (Wellbutrin XL) 150 MG 24 hr tablet    Social anxiety disorder        Relevant Medications    buPROPion XL (Wellbutrin XL) 150 MG 24 hr tablet          Mental Status Exam:   Hygiene:   good  Cooperation:  Cooperative  Eye Contact:  Good  Psychomotor Behavior:  Appropriate  Affect:  Full range  Mood: normal  Speech:  Normal  Thought Process:  Goal directed and Linear  Thought Content:  Normal  Suicidal:  None  Homicidal:  None  Hallucinations:  None  Delusion:  None  Memory:  Intact  Orientation:  Person, Place, Time and Situation  Reliability:  good  Insight:  Good  Judgement:  Good  Impulse Control:  Good  Physical/Medical Issues:  Yes Left knee injury     PHQ-9 Score:   PHQ-9 Total  Score: 0    Impression/Plan:  -This is a follow-up and medication check.  Teodoro is endorsing improved symptoms of depression and anxiety.  He feels his mood is stable and is having more motivation.  He continues to take classes for his masters program and he has now dropped his work hours to 32 hours/week and added a roommate.  Teodoro feels that he could benefit from therapy due to having some continual ruminating thoughts.  -Continue Wellbutrin  mg daily for depression and anxiety.  -Continue Zoloft 150 mg daily for depression and anxiety.  Patient has refills.  -Continue Atarax 10 mg 3 times daily as needed for anxiety.  Patient has refills.  -Add therapy to treatment plan.  Staff will consider making an appointment with Kathleen Zavala LCSW.    MEDS ORDERED DURING VISIT:  New Medications Ordered This Visit   Medications   • buPROPion XL (Wellbutrin XL) 150 MG 24 hr tablet     Sig: Take 1 tablet by mouth Daily.     Dispense:  30 tablet     Refill:  2       I spent 13 minutes caring for Teodoro on this date of service. This time includes time spent by me in the following activities:preparing for the visit, performing a medically appropriate examination and/or evaluation , counseling and educating the patient/family/caregiver, ordering medications, tests, or procedures, documenting information in the medical record and care coordination  Follow Up   Return in about 3 months (around 7/19/2021) for Medication Check.  Patient was given instructions and counseling regarding his condition or for health maintenance advice. Please see specific information pulled into the AVS if appropriate.       TREATMENT PLAN/GOALS: Continue supportive psychotherapy efforts and medications as indicated. Treatment and medication options discussed during today's visit. Patient acknowledged and verbally consented to continue with current treatment plan and was educated on the importance of compliance with treatment and follow-up  appointments.    MEDICATION ISSUES:  Discussed medication options and treatment plan of prescribed medication as well as the risks, benefits, and side effects including potential falls, possible impaired driving and metabolic adversities among others. Patient is agreeable to call the office with any worsening of symptoms or onset of side effects. Patient is agreeable to call 911 or go to the nearest ER should he/she begin having SI/HI.        This document has been electronically signed by ANEL Bethea, PMHNP-BC  April 19, 2021 09:45 EDT    Part of this note may be an electronic transcription/translation of spoken language to printed text using the Dragon Dictation System.

## 2021-04-20 ENCOUNTER — TREATMENT (OUTPATIENT)
Dept: PHYSICAL THERAPY | Facility: CLINIC | Age: 26
End: 2021-04-20

## 2021-04-20 DIAGNOSIS — M25.862 PATELLOFEMORAL DYSFUNCTION OF LEFT KNEE: ICD-10-CM

## 2021-04-20 DIAGNOSIS — M94.262 CHONDROMALACIA OF KNEE, LEFT: Primary | ICD-10-CM

## 2021-04-20 PROCEDURE — 97110 THERAPEUTIC EXERCISES: CPT | Performed by: PHYSICAL THERAPIST

## 2021-04-20 PROCEDURE — 97140 MANUAL THERAPY 1/> REGIONS: CPT | Performed by: PHYSICAL THERAPIST

## 2021-04-20 NOTE — PROGRESS NOTES
Physical Therapy Daily Progress Note    Patient Information  Teodoro Nina  1995      Visit # : 12    Teodoro Nina reports 3/10 pain today at rest.  Patient reports that his work schedule has been hectic. He states that it is still better than it was when he first started therapy. He states work seems to be the biggest irritating factor.         Objective Pt presents to PT today with no distress noted at rest.     Patient with mild tenderness along to lateral border of L patella.     No tenderness noted in L patellar tendon.       See Exercise, Manual, and Modality Logs for complete treatment.     Assessment/Plan  Patient tolerated session well with no reports of increased pain with exercises. Patient had reports of mild discomfort with manual therapy but was resolved at the completion of manual. Patient was encouraged to continue HEP at home and utilize ice as it is needed at home to assist with management of symptoms.       Progress per Plan of Care  PT will continue to monitor patients signs and symptoms and progress patient as tolerated.     Visit Diagnoses:    ICD-10-CM ICD-9-CM   1. Chondromalacia of knee, left  M94.262 717.7   2. Patellofemoral dysfunction of left knee  M25.862 719.86            Manual Therapy:    8     mins  79185;  Therapeutic Exercise:    27     mins  04291;     Neuromuscular Aung:        mins  30778;    Therapeutic Activity:     4     mins  22601;     Gait Training:           mins  50537;     Ultrasound:          mins  79404;    Electrical Stimulation:         mins  15686 ( );  Dry Needling          mins self-pay    Timed Treatment:   39   mins   Total Treatment:     53   mins          Reba Salas, PT  Physical Therapist

## 2021-04-27 ENCOUNTER — TREATMENT (OUTPATIENT)
Dept: PHYSICAL THERAPY | Facility: CLINIC | Age: 26
End: 2021-04-27

## 2021-04-27 DIAGNOSIS — M25.862 PATELLOFEMORAL DYSFUNCTION OF LEFT KNEE: ICD-10-CM

## 2021-04-27 DIAGNOSIS — M94.262 CHONDROMALACIA OF KNEE, LEFT: Primary | ICD-10-CM

## 2021-04-27 PROCEDURE — 97140 MANUAL THERAPY 1/> REGIONS: CPT | Performed by: PHYSICAL THERAPIST

## 2021-04-27 PROCEDURE — 97110 THERAPEUTIC EXERCISES: CPT | Performed by: PHYSICAL THERAPIST

## 2021-04-27 NOTE — PROGRESS NOTES
Physical Therapy Daily Progress Note    Patient Information  Teodoro Nina  1995      Visit # : 13    Teodoro Nina reports 2/10 pain today at rest.  Patient reports that he had a lot of pain over the weekend in his knee. He states that he received his second Covid vaccine on Friday and has been very fatigued. He states that since then his knee pain has returned to what it typically is.         Objective Pt presents to PT today with no distress noted at rest.     Patient with mild tenderness over L patellar tendon.     Patient with mild tenderness over L patella.       See Exercise, Manual, and Modality Logs for complete treatment.     Assessment/Plan  Patient tolerated session fair with mild increases in pain with exercises. Pain was resolved at the completion of each exercise. Patient had mild swelling noted along medial aspect of L knee. Patient had reports of mild discomfort with manual therapy. Patient was encouraged to continue HEP and utilize ice as it is needed to assist with symptom management at home.       Progress per Plan of Care  PT will continue to monitor patients signs and symptoms and progress patient as tolerated.     Visit Diagnoses:    ICD-10-CM ICD-9-CM   1. Chondromalacia of knee, left  M94.262 717.7   2. Patellofemoral dysfunction of left knee  M25.862 719.86            Manual Therapy:    10     mins  59209;  Therapeutic Exercise:    26     mins  67995;     Neuromuscular Aung:        mins  44151;    Therapeutic Activity:     5     mins  56744;     Gait Training:           mins  90427;     Ultrasound:          mins  59757;    Electrical Stimulation:         mins  52465 ( );  Dry Needling          mins self-pay    Timed Treatment:   41   mins   Total Treatment:     59   mins          Reba Salas PT  Physical Therapist

## 2021-05-04 ENCOUNTER — TREATMENT (OUTPATIENT)
Dept: PHYSICAL THERAPY | Facility: CLINIC | Age: 26
End: 2021-05-04

## 2021-05-04 DIAGNOSIS — M94.262 CHONDROMALACIA OF KNEE, LEFT: Primary | ICD-10-CM

## 2021-05-04 DIAGNOSIS — M25.862 PATELLOFEMORAL DYSFUNCTION OF LEFT KNEE: ICD-10-CM

## 2021-05-04 PROCEDURE — 97110 THERAPEUTIC EXERCISES: CPT | Performed by: PHYSICAL THERAPIST

## 2021-05-04 PROCEDURE — 97140 MANUAL THERAPY 1/> REGIONS: CPT | Performed by: PHYSICAL THERAPIST

## 2021-05-04 NOTE — PROGRESS NOTES
Physical Therapy Daily Progress Note    Patient Information  Teodoro Nina  1995      Visit # : 14    Teodoro Nina reports 1/10 pain today at rest.  Patient reports that he went hiking over the weekend and his knee did pretty well. He states that he had some pain and had to rest but he could tell his knee had made improvements. He states that he had a lot of pain on Sunday but has since calmed back down.         Objective Pt presents to PT today with no distress noted.     Patient with moderate tenderness along the L patellar tendon.     Patient with mild tenderness along medial and lateral aspect of L patella.       See Exercise, Manual, and Modality Logs for complete treatment.     Assessment/Plan  Patient tolerated session well with no increase in pain with exercises. Patient had reports of slight discomfort in L knee at end of session but this was improved following cryotherapy. Patient continues to have complaints of increased pain in L patellar tendon with increased activity.        Progress per Plan of Care  PT will continue to monitor patients signs and symptoms and progress patient as tolerated.     Visit Diagnoses:    ICD-10-CM ICD-9-CM   1. Chondromalacia of knee, left  M94.262 717.7   2. Patellofemoral dysfunction of left knee  M25.862 719.86            Manual Therapy:    9     mins  57091;  Therapeutic Exercise:    26     mins  40222;     Neuromuscular Aung:        mins  62867;    Therapeutic Activity:     6     mins  61893;     Gait Training:           mins  84136;     Ultrasound:          mins  00445;    Electrical Stimulation:         mins  25999 ( );  Dry Needling          mins self-pay    Timed Treatment:   41   mins   Total Treatment:     53   mins          Reba Salas PT  Physical Therapist

## 2021-05-05 ENCOUNTER — OFFICE VISIT (OUTPATIENT)
Dept: PSYCHIATRY | Facility: CLINIC | Age: 26
End: 2021-05-05

## 2021-05-05 VITALS
SYSTOLIC BLOOD PRESSURE: 123 MMHG | DIASTOLIC BLOOD PRESSURE: 74 MMHG | WEIGHT: 234 LBS | BODY MASS INDEX: 31.01 KG/M2 | HEIGHT: 73 IN

## 2021-05-05 DIAGNOSIS — F41.1 GENERALIZED ANXIETY DISORDER: Primary | ICD-10-CM

## 2021-05-05 DIAGNOSIS — F33.0 MILD EPISODE OF RECURRENT MAJOR DEPRESSIVE DISORDER (HCC): ICD-10-CM

## 2021-05-05 PROCEDURE — 90837 PSYTX W PT 60 MINUTES: CPT | Performed by: SOCIAL WORKER

## 2021-05-05 NOTE — PROGRESS NOTES
Date: 05/05/2021   Time In: 1535  Time Out: 1630    PROGRESS NOTE  Data:  Teodoro Nina is a 26 y.o. male who presents today for individual therapy session at [facility].     ICD-10-CM ICD-9-CM   1. Generalized anxiety disorder  F41.1 300.02   2. Mild episode of recurrent major depressive disorder (CMS/HCC)  F33.0 296.31     Patient presents today with concerns regarding his anxiety. Patient reports feeling irritable, having difficulty talking, isolating himself and lack of motivation beginning in fall of 2020. Patient reports worry anticipating his grandparent's death due to age. Patient also reports worry regarding his roommate due to roommate's mental health. Patient reports his medication has helped him with his anxiety but he continues to endorse lack of motivation and interest in things. Patient reports he continues to work and attend graduate school for social work. Patient denies suicidal thoughts and self harm behaviors. Patient reports current coping skills include walking in nature and meditation. Patient goals include working on his irritability, finding interest in doing things and establishing boundaries with his roommate.      Clinical Maneuvering/Intervention:    (Scales based on 0 - 10 with 10 being the worst)  Depression:   4 Anxiety:  3       Assisted patient in processing above session content; acknowledged and normalized patient’s thoughts, feelings, and concerns.  Rationalized patient thought process regarding boundaries with roomate.  Discussed triggers associated with patient's anxiety.  Also discussed coping skills for patient to implement such as breathing exercises and mindfulness.    Allowed patient to freely discuss issues without interruption or judgment. Provided safe, confidential environment to facilitate the development of positive therapeutic relationship and encourage open, honest communication. Assisted patient in identifying risk factors which would indicate  the need for higher level of care including thoughts to harm self or others and/or self-harming behavior and encouraged patient to contact this office, call 911, or present to the nearest emergency room should any of these events occur. Discussed crisis intervention services and means to access. Patient adamantly and convincingly denies current suicidal or homicidal ideation or perceptual disturbance.    Assessment   Patient appears to maintain relative stability as compared to their baseline.  However, patient continues to struggle with anxiety and depression which continues to cause impairment in important areas of functioning.  As a result, they can be reasonably expected to continue to benefit from treatment and would likely be at increased risk for decompensation otherwise.    Mental Status Exam:   Hygiene:   good  Cooperation:  Cooperative  Eye Contact:  Good  Psychomotor Behavior:  Slow  Affect:  Blunted  Mood: sad  Speech:  Normal  Thought Process:  Goal directed and Linear  Thought Content:  Normal  Suicidal:  None  Homicidal:  None  Hallucinations:  None  Delusion:  None  Memory:  Intact  Orientation:  Person, Place, Time and Situation  Reliability:  good  Insight:  Good  Judgement:  Good and Fair  Impulse Control:  Good  Physical/Medical Issues:  No      Patient's Support Network Includes:  parents and family    Functional Status: Mild impairment     Progress toward goal: Not at goal    Prognosis: Good with Ongoing Treatment          Plan     Patient will continue in individual outpatient therapy with focus on improved functioning and coping skills, maintaining stability, and avoiding decompensation and the need for higher level of care.    Patient will adhere to medication regimen as prescribed and report any side effects. Patient will contact this office, call 911 or present to the nearest emergency room should suicidal or homicidal ideations occur. Provide Cognitive Behavioral Therapy and Solution  Focused Therapy to improve functioning, maintain stability, and avoid decompensation and the need for higher level of care.     Return in about Return in about 2 weeks (around 5/19/2021) for Next scheduled follow up. or earlier if symptoms worsen or fail to improve.            This document has been electronically signed by Kathleen Zavala LCSW  May 5, 2021 16:26 EDT      Part of this note may be an electronic transcription/translation of spoken language to printed text using the Dragon Dictation System.

## 2021-05-07 ENCOUNTER — TREATMENT (OUTPATIENT)
Dept: PHYSICAL THERAPY | Facility: CLINIC | Age: 26
End: 2021-05-07

## 2021-05-07 DIAGNOSIS — M25.862 PATELLOFEMORAL DYSFUNCTION OF LEFT KNEE: ICD-10-CM

## 2021-05-07 DIAGNOSIS — M94.262 CHONDROMALACIA OF KNEE, LEFT: Primary | ICD-10-CM

## 2021-05-07 PROCEDURE — 97140 MANUAL THERAPY 1/> REGIONS: CPT | Performed by: PHYSICAL THERAPIST

## 2021-05-07 PROCEDURE — 97110 THERAPEUTIC EXERCISES: CPT | Performed by: PHYSICAL THERAPIST

## 2021-05-07 NOTE — PROGRESS NOTES
Physical Therapy Daily Progress Note    Patient Information  Teodoro Nina  1995      Visit # : 15    Teodoro Nina reports 1/10 pain today at rest.  Patient reports that he knee seems to be doing better now. He states that he has not has as much pain lately. He states that he worked yesterday and his knee bothered him some but it was much better than it has recently been.         Objective Pt presents to PT today with no distress noted.     Patient with mild tenderness in quad tendon.    Patient with no tenderness noted in patellar tendon.      See Exercise, Manual, and Modality Logs for complete treatment.     Assessment/Plan  Patient tolerated session well with no increases in pain with exercises. Patient had no discomfort with manual therapy today. Patient with reduced pain and tenderness in L knee today.       Progress per Plan of Care  PT will continue to monitor patients signs and symptoms and progress patient as tolerated.     Visit Diagnoses:    ICD-10-CM ICD-9-CM   1. Chondromalacia of knee, left  M94.262 717.7   2. Patellofemoral dysfunction of left knee  M25.862 719.86            Manual Therapy:    8     mins  97010;  Therapeutic Exercise:    26     mins  07775;     Neuromuscular Aung:        mins  97202;    Therapeutic Activity:     5     mins  50886;     Gait Training:           mins  38241;     Ultrasound:          mins  83598;    Electrical Stimulation:         mins  79446 ( );  Dry Needling          mins self-pay    Timed Treatment:   39   mins   Total Treatment:     54   mins          Reba Salas PT  Physical Therapist

## 2021-05-11 ENCOUNTER — TREATMENT (OUTPATIENT)
Dept: PHYSICAL THERAPY | Facility: CLINIC | Age: 26
End: 2021-05-11

## 2021-05-11 DIAGNOSIS — M25.562 CHRONIC PAIN OF LEFT KNEE: Primary | ICD-10-CM

## 2021-05-11 DIAGNOSIS — G89.29 CHRONIC PAIN OF LEFT KNEE: Primary | ICD-10-CM

## 2021-05-11 PROCEDURE — 97530 THERAPEUTIC ACTIVITIES: CPT | Performed by: PHYSICAL THERAPIST

## 2021-05-11 PROCEDURE — 97110 THERAPEUTIC EXERCISES: CPT | Performed by: PHYSICAL THERAPIST

## 2021-05-11 PROCEDURE — 97140 MANUAL THERAPY 1/> REGIONS: CPT | Performed by: PHYSICAL THERAPIST

## 2021-05-11 PROCEDURE — 97112 NEUROMUSCULAR REEDUCATION: CPT | Performed by: PHYSICAL THERAPIST

## 2021-05-11 NOTE — PROGRESS NOTES
Physical Therapy Daily Progress Note    Patient: Teodoro Nina   : 1995  Diagnosis/ICD-10 Code:  Chronic pain of left knee [M25.562, G89.29]  Referring practitioner: KATIANA Rabago  Date of Initial Visit: Type: THERAPY  Noted: 3/1/2021  Today's Date: 2021  Patient seen for 16 sessions         Teodoro Nina reports feeling quite a bit better overall.  Is able to stand longer without pain while working.  Has no pain in the morning, but starts to notice some sensitivity in the knee during the afternoon, which is an improvement.  Is sleeping better, but does have some disruption when lying on his left side, at times.  Likes to hike and would like to get back to that.      Subjective     Objective   See Exercise, Manual, and Modality Logs for complete treatment.       Assessment/Plan  Focused visit on reducing anterior knee sensitivity, loading tolerance, proximal hip strength and general balance.         Manual Therapy:    8     mins  86636;  Therapeutic Exercise:    12     mins  20342;     Neuromuscular Aung:    8    mins  84576;    Therapeutic Activity:     12     mins  73526;     Gait Training:           mins  89181;     Ultrasound:          mins  42340;    Electrical Stimulation:         mins  62631 ( );  Dry Needling          mins self-pay    Timed Treatment:   40   mins   Total Treatment:     40   mins    Brody Colindres PT  Physical Therapist

## 2021-05-19 ENCOUNTER — TREATMENT (OUTPATIENT)
Dept: PHYSICAL THERAPY | Facility: CLINIC | Age: 26
End: 2021-05-19

## 2021-05-19 DIAGNOSIS — G89.29 CHRONIC PAIN OF LEFT KNEE: Primary | ICD-10-CM

## 2021-05-19 DIAGNOSIS — M94.262 CHONDROMALACIA OF KNEE, LEFT: ICD-10-CM

## 2021-05-19 DIAGNOSIS — M25.562 CHRONIC PAIN OF LEFT KNEE: Primary | ICD-10-CM

## 2021-05-19 DIAGNOSIS — M25.862 PATELLOFEMORAL DYSFUNCTION OF LEFT KNEE: ICD-10-CM

## 2021-05-19 PROCEDURE — 97140 MANUAL THERAPY 1/> REGIONS: CPT | Performed by: PHYSICAL THERAPIST

## 2021-05-19 PROCEDURE — 97110 THERAPEUTIC EXERCISES: CPT | Performed by: PHYSICAL THERAPIST

## 2021-05-19 NOTE — PROGRESS NOTES
Physical Therapy Daily Progress Note    Patient Information  Teodoro Nina  1995      Visit # : 17    Teodoro Nina reports 0/10 pain today at rest.  Patient reports that his knee has been doing better. He states that he has been having less pain at work and feels like he is able to stand for longer periods of time.         Objective Pt presents to PT today with no distress noted.    Patient with very mild tenderness in L patellar tendon.     No other tenderness noted in L knee today.       See Exercise, Manual, and Modality Logs for complete treatment.     Assessment/Plan  Patient tolerated session well with no reports of pain with exercises. Patient tolerated manual therapy well with no reports of pain or discomfort. Patient has mild tenderness in L patellar tendon but is tolerating weightbearing better. Patient has reports of fatigue in L knee after working a full day.       Progress per Plan of Care  PT will continue to monitor patients signs and symptoms and progress patient as tolerated.     Visit Diagnoses:    ICD-10-CM ICD-9-CM   1. Chronic pain of left knee  M25.562 719.46    G89.29 338.29   2. Chondromalacia of knee, left  M94.262 717.7   3. Patellofemoral dysfunction of left knee  M25.862 719.86            Manual Therapy:    9     mins  34256;  Therapeutic Exercise:    28     mins  22192;     Neuromuscular Aung:        mins  17093;    Therapeutic Activity:     4     mins  52348;     Gait Training:           mins  64356;     Ultrasound:          mins  79317;    Electrical Stimulation:         mins  98962 ( );  Dry Needling          mins self-pay    Timed Treatment:   41   mins   Total Treatment:     51   mins          Reba Salas PT  Physical Therapist

## 2021-07-14 ENCOUNTER — OFFICE VISIT (OUTPATIENT)
Dept: INTERNAL MEDICINE | Facility: CLINIC | Age: 26
End: 2021-07-14

## 2021-07-14 VITALS
SYSTOLIC BLOOD PRESSURE: 112 MMHG | RESPIRATION RATE: 14 BRPM | WEIGHT: 238 LBS | BODY MASS INDEX: 31.54 KG/M2 | DIASTOLIC BLOOD PRESSURE: 62 MMHG | TEMPERATURE: 98 F | HEIGHT: 73 IN | HEART RATE: 70 BPM | OXYGEN SATURATION: 97 %

## 2021-07-14 DIAGNOSIS — F41.9 ANXIETY: ICD-10-CM

## 2021-07-14 DIAGNOSIS — E66.9 CLASS 1 OBESITY WITHOUT SERIOUS COMORBIDITY WITH BODY MASS INDEX (BMI) OF 31.0 TO 31.9 IN ADULT, UNSPECIFIED OBESITY TYPE: Primary | ICD-10-CM

## 2021-07-14 DIAGNOSIS — F32.A DEPRESSION, UNSPECIFIED DEPRESSION TYPE: ICD-10-CM

## 2021-07-14 DIAGNOSIS — N62 GYNECOMASTIA: ICD-10-CM

## 2021-07-14 PROCEDURE — 99204 OFFICE O/P NEW MOD 45 MIN: CPT | Performed by: INTERNAL MEDICINE

## 2021-07-14 NOTE — PROGRESS NOTES
Subjective   Teodoro Nina is a 26 y.o. male.     Chief Complaint   Patient presents with   • Establish Care     Establish new PCP former patient of Veena Roman in [facility], patient c/o gynecomastia        History of Present Illness   Patient complaints breast development and very frustrated with it.  Bilateral asymmetric without mass or pain or nipple discharge.  Patient states that he has been obese all his life and is trying to exercise to lose weight patient did lose weight but still obese now.  Depression anxiety stable on medication.    Current Outpatient Medications:   •  buPROPion XL (Wellbutrin XL) 150 MG 24 hr tablet, Take 1 tablet by mouth Daily., Disp: 30 tablet, Rfl: 2  •  hydrOXYzine (ATARAX) 10 MG tablet, Take 1 to 2 tablets PO daily prn panic, Disp: 60 tablet, Rfl: 5  •  sertraline (Zoloft) 100 MG tablet, Take 1.5 tablets by mouth Daily., Disp: 45 tablet, Rfl: 2    The following portions of the patient's history were reviewed and updated as appropriate: allergies, current medications, past family history, past medical history, past social history, past surgical history and problem list.    Review of Systems   Constitutional: Negative.    Respiratory: Negative.    Cardiovascular: Negative.    Gastrointestinal: Negative.    Musculoskeletal: Negative.    Skin: Negative.    Neurological: Negative.    Psychiatric/Behavioral: Negative.        Objective   Physical Exam  Cardiovascular:      Rate and Rhythm: Normal rate and regular rhythm.      Heart sounds: Normal heart sounds.   Pulmonary:      Effort: Pulmonary effort is normal.      Breath sounds: Normal breath sounds.   Abdominal:      General: Bowel sounds are normal.   Musculoskeletal:      Cervical back: Neck supple.   Skin:     General: Skin is warm.   Neurological:      Mental Status: He is alert and oriented to person, place, and time.   Psychiatric:         Behavior: Behavior normal.         All tests have been reviewed.    Assessment/Plan    Diagnoses and all orders for this visit:    Class 1 obesity without serious comorbidity with body mass index (BMI) of 31.0 to 31.9 in adult, unspecified obesity type continue good diet weight loss    Gynecomastia check lab  -     Prolactin  -     Testosterone, Free, Total  -     Estradiol  -     CBC & Differential  -     Comprehensive Metabolic Panel  -     Lipid Panel  -     TSH    Depression, continue medication follow-up with behavioral    Anxiety continue medication    1 mo after lab

## 2021-08-03 ENCOUNTER — OFFICE VISIT (OUTPATIENT)
Dept: PSYCHIATRY | Facility: CLINIC | Age: 26
End: 2021-08-03

## 2021-08-03 VITALS
SYSTOLIC BLOOD PRESSURE: 132 MMHG | DIASTOLIC BLOOD PRESSURE: 70 MMHG | WEIGHT: 236 LBS | BODY MASS INDEX: 31.28 KG/M2 | HEART RATE: 84 BPM | HEIGHT: 73 IN

## 2021-08-03 DIAGNOSIS — F33.0 MILD EPISODE OF RECURRENT MAJOR DEPRESSIVE DISORDER (HCC): Primary | ICD-10-CM

## 2021-08-03 DIAGNOSIS — F40.10 SOCIAL ANXIETY DISORDER: ICD-10-CM

## 2021-08-03 DIAGNOSIS — F41.1 GENERALIZED ANXIETY DISORDER: ICD-10-CM

## 2021-08-03 PROCEDURE — 99213 OFFICE O/P EST LOW 20 MIN: CPT | Performed by: NURSE PRACTITIONER

## 2021-08-03 RX ORDER — BUPROPION HYDROCHLORIDE 150 MG/1
150 TABLET ORAL DAILY
Qty: 90 TABLET | Refills: 0 | Status: SHIPPED | OUTPATIENT
Start: 2021-08-03 | End: 2021-11-02 | Stop reason: SDDI

## 2021-08-03 RX ORDER — SERTRALINE HYDROCHLORIDE 100 MG/1
150 TABLET, FILM COATED ORAL DAILY
Qty: 135 TABLET | Refills: 0 | Status: SHIPPED | OUTPATIENT
Start: 2021-08-03 | End: 2021-11-02 | Stop reason: SDDI

## 2021-08-03 NOTE — PROGRESS NOTES
"Chief Complaint  Depression, anxiety, and social anxiety      Subjective          Teodoro Nina presents to [facility] by himself for a follow up and medication check.    History of Present Illness: Teodoro states, \"I am good.\"  He tells me that he received the \"bad\" news of a coworker resigning recently.  He tells me they worked together for three years and they were close friends.  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  He tells me he has had some issues enrolling in classes but is feeling hopeful to start in the fall.  He tells me that, last semester, he had issues with motivation.  He feels that the stress of school and winter months worsened his mood.  He reports feeling \"down\" quite a bit throughout the semester.  He does report having improved symptoms of depression and anxiety.  He tells me he feels more motivated.  He also feels that the \"disconnect\" from his family has improved.  He took a vacation to [place] over the summer.  He has also resumed working out for the last three months.  He continues to live with his roommate.  He identifies the relationship as \"complicated\" as that Teodoro is aware of his feelings for the roommate but that they are currently in a friendship type relationship.  He states, \"he has said that he is not in love with me.\"  Teodoro was in therapy with Kathleen Zavala.  He feels that there was a lack of connection as she was \"very by the book.\"  Teodoro continues to be motivated to find a therapist.  He is currently taking Wellbutrin XL, Zoloft, and hydroxyzine.  He denies any side effects of his current medication regiment.  He denies any problems with sleep or appetite.  He denies any SI/HI/AVH.    Current Medications:   Current Outpatient Medications   Medication Sig Dispense Refill   • buPROPion XL (Wellbutrin XL) 150 MG 24 hr tablet Take 1 tablet by mouth Daily. 90 tablet 0   • hydrOXYzine (ATARAX) 10 MG tablet " "Take 1 to 2 tablets PO daily prn panic 60 tablet 5   • sertraline (Zoloft) 100 MG tablet Take 1.5 tablets by mouth Daily. 135 tablet 0     No current facility-administered medications for this visit.         Objective   Vital Signs:   /70   Pulse 84   Ht 185.4 cm (73\")   Wt 107 kg (236 lb)   BMI 31.14 kg/m²     Physical Exam  Vitals and nursing note reviewed.   Constitutional:       Appearance: Normal appearance. He is well-developed.   Musculoskeletal:         General: Normal range of motion.   Skin:     General: Skin is warm and dry.   Neurological:      Mental Status: He is alert and oriented to person, place, and time.   Psychiatric:         Attention and Perception: Attention normal.         Mood and Affect: Mood normal.         Speech: Speech normal.         Behavior: Behavior normal. Behavior is cooperative.         Thought Content: Thought content normal.         Cognition and Memory: Cognition normal.         Judgment: Judgment normal.        Result Review :     The following data was reviewed by: ANEL Mcghee on 08/03/2021:    Office Visit with Du Saavedra MD (07/14/2021)  Office Visit with Kathleen Zavala LCSW (05/05/2021)           Assessment and Plan    Problem List Items Addressed This Visit        Mental Health    Depression - Primary    Relevant Medications    buPROPion XL (Wellbutrin XL) 150 MG 24 hr tablet    sertraline (Zoloft) 100 MG tablet      Other Visit Diagnoses     Generalized anxiety disorder        Relevant Medications    buPROPion XL (Wellbutrin XL) 150 MG 24 hr tablet    sertraline (Zoloft) 100 MG tablet    Social anxiety disorder        Relevant Medications    buPROPion XL (Wellbutrin XL) 150 MG 24 hr tablet    sertraline (Zoloft) 100 MG tablet          Mental Status Exam:   Hygiene:   good  Cooperation:  Cooperative  Eye Contact:  Good  Psychomotor Behavior:  Appropriate  Affect:  Appropriate  Mood: normal  Speech:  Normal  Thought Process:  Goal " directed and Linear  Thought Content:  Normal  Suicidal:  None  Homicidal:  None  Hallucinations:  None  Delusion:  None  Memory:  Intact  Orientation:  Person, Place, Time and Situation  Reliability:  good  Insight:  Good  Judgement:  Good  Impulse Control:  Good  Physical/Medical Issues:  No      PHQ-9 Score:   PHQ-9 Total Score: 0    Impression/Plan:  -This is a follow-up and medication check.  Teodoro is endorsing improved symptoms of depression and anxiety.  He feels his mood is stable and is having more motivation.  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 He is living with his roommate and they are getting along well as friends. He is working out and enjoying the break from school for the summer. He reports improved ruminations or obsessive thoughts. He was seeing Kathleen for therapy but would like to try another provider. I gave him the resource [facility] [place]. He is pleased with his current medication regimen and would like to continue at current doses.  -Continue Wellbutrin  mg daily for depression and anxiety.  -Continue Zoloft 150 mg daily for depression and anxiety.    -Continue Atarax 10 mg 3 times daily as needed for anxiety.  Patient has refills.  -Resume therapy to treatment plan.      MEDS ORDERED DURING VISIT:  New Medications Ordered This Visit   Medications   • buPROPion XL (Wellbutrin XL) 150 MG 24 hr tablet     Sig: Take 1 tablet by mouth Daily.     Dispense:  90 tablet     Refill:  0   • sertraline (Zoloft) 100 MG tablet     Sig: Take 1.5 tablets by mouth Daily.     Dispense:  135 tablet     Refill:  0       I spent 24 minutes caring for Teodoro on this date of service. This time includes time spent by me in the following activities:preparing for the visit, obtaining and/or reviewing a separately obtained history, performing a medically appropriate examination and/or evaluation , counseling and educating the  patient/family/caregiver, ordering medications, tests, or procedures, documenting information in the medical record and care coordination  Follow Up   Return in about 3 months (around 11/3/2021) for Medication Check.  Patient was given instructions and counseling regarding his condition or for health maintenance advice. Please see specific information pulled into the AVS if appropriate.       TREATMENT PLAN/GOALS: Continue supportive psychotherapy efforts and medications as indicated. Treatment and medication options discussed during today's visit. Patient acknowledged and verbally consented to continue with current treatment plan and was educated on the importance of compliance with treatment and follow-up appointments.    MEDICATION ISSUES:  Discussed medication options and treatment plan of prescribed medication as well as the risks, benefits, and side effects including potential falls, possible impaired driving and metabolic adversities among others. Patient is agreeable to call the office with any worsening of symptoms or onset of side effects. Patient is agreeable to call 911 or go to the nearest ER should he/she begin having SI/HI.        This document has been electronically signed by ANEL Bethea, PMHNP-BC  August 3, 2021 18:00 EDT    Part of this note may be an electronic transcription/translation of spoken language to printed text using the Dragon Dictation System.

## 2021-08-11 ENCOUNTER — OFFICE VISIT (OUTPATIENT)
Dept: ORTHOPEDIC SURGERY | Facility: CLINIC | Age: 26
End: 2021-08-11

## 2021-08-11 VITALS — HEIGHT: 73 IN | BODY MASS INDEX: 31.28 KG/M2 | WEIGHT: 236 LBS | TEMPERATURE: 98.6 F

## 2021-08-11 DIAGNOSIS — M22.42 PATELLA, CHONDROMALACIA, LEFT: ICD-10-CM

## 2021-08-11 DIAGNOSIS — M25.862 PATELLOFEMORAL DYSFUNCTION, LEFT: ICD-10-CM

## 2021-08-11 DIAGNOSIS — M25.562 ARTHRALGIA OF LEFT KNEE: Primary | ICD-10-CM

## 2021-08-11 PROCEDURE — 99213 OFFICE O/P EST LOW 20 MIN: CPT | Performed by: PHYSICIAN ASSISTANT

## 2021-08-11 NOTE — PROGRESS NOTES
"Subjective   Patient ID: Teodoro Nina is a 26 y.o. right hand dominant male  Pain of the Left Knee (Reports he completed PT that helped but he still has pain and instability at times, reports giving way)         History of Present Illness    Patient is following up for scheduled visit regarding continued left knee pain.  He has been experiencing intermittent knee pain since April 2020.  Patient has used oral analgesics as well as formal physical therapy.  He states the therapy did help minimally but he is still experiencing discomfort to the anterior left knee when going up or down stairs.  At times he notices the left knee will \"give out\"        Past Medical History:   Diagnosis Date   • Anxiety         Past Surgical History:   Procedure Laterality Date   • ADENOIDECTOMY     • CHOLECYSTECTOMY     • TONSILLECTOMY         Family History   Problem Relation Age of Onset   • Anxiety disorder Mother    • Drug abuse Mother    • Bipolar disorder Father    • Depression Father    • Drug abuse Father    • Suicide Attempts Father    • Bipolar disorder Sister    • Drug abuse Sister    • ODD Nephew    • ADD / ADHD Neg Hx    • Alcohol abuse Neg Hx    • Dementia Neg Hx    • OCD Neg Hx    • Schizophrenia Neg Hx    • Paranoid behavior Neg Hx    • Self-Injurious Behavior  Neg Hx    • Seizures Neg Hx        Social History     Socioeconomic History   • Marital status: Single     Spouse name: Not on file   • Number of children: 0   • Years of education: Not on file   • Highest education level: Some college, no degree   Tobacco Use   • Smoking status: Former Smoker     Packs/day: 0.25     Types: Electronic Cigarette   • Smokeless tobacco: Never Used   Vaping Use   • Vaping Use: Every day   • Substances: Nicotine   • Devices: Pre-filled pod   Substance and Sexual Activity   • Alcohol use: Yes     Alcohol/week: 1.0 standard drinks     Types: 1 Shots of liquor per week     Comment: OCCASIONAL   • Drug use: No   • Sexual activity: Yes     " "Partners: Male     Birth control/protection: None         Current Outpatient Medications:   •  buPROPion XL (Wellbutrin XL) 150 MG 24 hr tablet, Take 1 tablet by mouth Daily., Disp: 90 tablet, Rfl: 0  •  hydrOXYzine (ATARAX) 10 MG tablet, Take 1 to 2 tablets PO daily prn panic, Disp: 60 tablet, Rfl: 5  •  sertraline (Zoloft) 100 MG tablet, Take 1.5 tablets by mouth Daily., Disp: 135 tablet, Rfl: 0    No Known Allergies    Review of Systems   Constitutional: Negative for diaphoresis, fever and unexpected weight change.   HENT: Negative for dental problem and sore throat.    Eyes: Negative for visual disturbance.   Respiratory: Negative for shortness of breath.    Cardiovascular: Negative for chest pain.   Gastrointestinal: Negative for abdominal pain, constipation, diarrhea, nausea and vomiting.   Genitourinary: Negative for difficulty urinating and frequency.   Musculoskeletal: Positive for arthralgias (left knee) and joint swelling (left knee).   Neurological: Negative for headaches.   Hematological: Does not bruise/bleed easily.       I have reviewed the medical and surgical history, family history, social history, medications, and/or allergies, and the review of systems of this report.    Objective   Temp 98.6 °F (37 °C) (Skin)   Ht 185.4 cm (73\")   Wt 107 kg (236 lb)   BMI 31.14 kg/m²    Physical Exam  Vitals and nursing note reviewed.   Constitutional:       Appearance: Normal appearance.   Pulmonary:      Effort: Pulmonary effort is normal.   Musculoskeletal:      Left knee: Crepitus present. No deformity, effusion, erythema or ecchymosis. Tenderness present. ACL laxity present. No LCL laxity, MCL laxity or PCL laxity.Abnormal patellar mobility.      Instability Tests: Anterior drawer test positive.   Neurological:      Mental Status: He is alert and oriented to person, place, and time.       Left Knee Exam     Other   Effusion: no effusion present           Extremity DVT signs are negative on physical exam " with negative Paulo sign, no calf pain, no palpable cords and no skin tone change   Neurologic Exam     Mental Status   Oriented to person, place, and time.      Left patella crepitus        Assessment/Plan   Independent Review of Radiographic Studies:    No new imaging done today.  Reviewed imaging with patient at a prior visit.      Procedures       Diagnoses and all orders for this visit:    1. Arthralgia of left knee (Primary)  -     MRI Knee Left Without Contrast    2. Patella, chondromalacia, left  -     MRI Knee Left Without Contrast    3. Patellofemoral dysfunction, left  -     MRI Knee Left Without Contrast       Orthopedic activities reviewed and patient expressed appreciation  Discussion of orthopedic goals  Risk, benefits, and merits of treatment alternatives reviewed with the patient and questions answered    Recommendations/Plan:  Exercise, medications, injections, other patient advice, and return appointment as noted.  Patient is encouraged to call or return for any issues or concerns.    Because the patient has tried numerous conservative measures including formal physical therapy without improvement I do feel MRI is necessary to further investigate why he is still experiencing discomfort    Patient agreeable to call or return sooner for any concerns.               EMR Dragon-transcription disclaimer:  This encounter note is an electronic transcription of spoken language to printed text.  Electronic transcription of spoken language may permit erroneous or at times nonsensical words or phrases to be inadvertently transcribed.  Although I have reviewed the note for such errors, some may still exist

## 2021-08-17 DIAGNOSIS — F33.0 MILD EPISODE OF RECURRENT MAJOR DEPRESSIVE DISORDER (HCC): ICD-10-CM

## 2021-08-17 RX ORDER — BUPROPION HYDROCHLORIDE 150 MG/1
150 TABLET ORAL DAILY
Qty: 90 TABLET | Refills: 0 | OUTPATIENT
Start: 2021-08-17

## 2021-09-07 ENCOUNTER — HOSPITAL ENCOUNTER (OUTPATIENT)
Dept: MRI IMAGING | Facility: HOSPITAL | Age: 26
Discharge: HOME | End: 2021-09-07
Admitting: PHYSICIAN ASSISTANT

## 2021-09-07 PROCEDURE — 73721 MRI JNT OF LWR EXTRE W/O DYE: CPT

## 2021-09-09 ENCOUNTER — OFFICE VISIT (OUTPATIENT)
Dept: ORTHOPEDIC SURGERY | Facility: CLINIC | Age: 26
End: 2021-09-09

## 2021-09-09 VITALS — WEIGHT: 233 LBS | HEIGHT: 73 IN | TEMPERATURE: 97.9 F | RESPIRATION RATE: 18 BRPM | BODY MASS INDEX: 30.88 KG/M2

## 2021-09-09 DIAGNOSIS — S83.282A TEAR OF LATERAL MENISCUS OF LEFT KNEE, CURRENT, UNSPECIFIED TEAR TYPE, INITIAL ENCOUNTER: ICD-10-CM

## 2021-09-09 DIAGNOSIS — M22.42 PATELLA, CHONDROMALACIA, LEFT: Primary | ICD-10-CM

## 2021-09-09 PROCEDURE — 99213 OFFICE O/P EST LOW 20 MIN: CPT | Performed by: PHYSICIAN ASSISTANT

## 2021-09-09 PROCEDURE — 20610 DRAIN/INJ JOINT/BURSA W/O US: CPT | Performed by: PHYSICIAN ASSISTANT

## 2021-09-09 RX ORDER — LIDOCAINE HYDROCHLORIDE 10 MG/ML
2 INJECTION, SOLUTION INFILTRATION; PERINEURAL
Status: COMPLETED | OUTPATIENT
Start: 2021-09-09 | End: 2021-09-09

## 2021-09-09 RX ORDER — TRIAMCINOLONE ACETONIDE 40 MG/ML
40 INJECTION, SUSPENSION INTRA-ARTICULAR; INTRAMUSCULAR
Status: COMPLETED | OUTPATIENT
Start: 2021-09-09 | End: 2021-09-09

## 2021-09-09 RX ADMIN — TRIAMCINOLONE ACETONIDE 40 MG: 40 INJECTION, SUSPENSION INTRA-ARTICULAR; INTRAMUSCULAR at 15:36

## 2021-09-09 RX ADMIN — LIDOCAINE HYDROCHLORIDE 2 ML: 10 INJECTION, SOLUTION INFILTRATION; PERINEURAL at 15:36

## 2021-09-09 NOTE — PROGRESS NOTES
Subjective   Patient ID: Teodoro Nina is a 26 y.o. right hand dominant male  Follow-up of the Left Knee (MRI results.)         History of Present Illness  Patient presents to review MRI results of the left knee.  He still has discomfort to the left knee.  He has tried formal physical therapy, rest and oral analgesics without improvement.                                                 Past Medical History:   Diagnosis Date   • Anxiety         Past Surgical History:   Procedure Laterality Date   • ADENOIDECTOMY     • CHOLECYSTECTOMY     • TONSILLECTOMY         Family History   Problem Relation Age of Onset   • Anxiety disorder Mother    • Drug abuse Mother    • Bipolar disorder Father    • Depression Father    • Drug abuse Father    • Suicide Attempts Father    • Bipolar disorder Sister    • Drug abuse Sister    • ODD Nephew    • ADD / ADHD Neg Hx    • Alcohol abuse Neg Hx    • Dementia Neg Hx    • OCD Neg Hx    • Schizophrenia Neg Hx    • Paranoid behavior Neg Hx    • Self-Injurious Behavior  Neg Hx    • Seizures Neg Hx        Social History     Socioeconomic History   • Marital status: Single     Spouse name: Not on file   • Number of children: 0   • Years of education: Not on file   • Highest education level: Some college, no degree   Tobacco Use   • Smoking status: Former Smoker     Packs/day: 0.25     Types: Electronic Cigarette   • Smokeless tobacco: Never Used   Vaping Use   • Vaping Use: Every day   • Substances: Nicotine   • Devices: Pre-filled pod   Substance and Sexual Activity   • Alcohol use: Yes     Alcohol/week: 1.0 standard drinks     Types: 1 Shots of liquor per week     Comment: OCCASIONAL   • Drug use: No   • Sexual activity: Yes     Partners: Male     Birth control/protection: None         Current Outpatient Medications:   •  buPROPion XL (Wellbutrin XL) 150 MG 24 hr tablet, Take 1 tablet by mouth Daily., Disp: 90 tablet, Rfl: 0  •  hydrOXYzine (ATARAX) 10 MG tablet, Take 1 to 2 tablets PO daily  "prn panic, Disp: 60 tablet, Rfl: 5  •  sertraline (Zoloft) 100 MG tablet, Take 1.5 tablets by mouth Daily., Disp: 135 tablet, Rfl: 0    No Known Allergies    Review of Systems   Constitutional: Negative for fever.   HENT: Negative for dental problem and voice change.    Eyes: Negative for visual disturbance.   Respiratory: Negative for shortness of breath.    Cardiovascular: Negative for chest pain.   Gastrointestinal: Negative for abdominal pain.   Genitourinary: Negative for dysuria.   Musculoskeletal: Positive for arthralgias (left knee) and joint swelling (left knee). Negative for gait problem.   Skin: Negative for rash.   Neurological: Negative for speech difficulty.   Hematological: Does not bruise/bleed easily.   Psychiatric/Behavioral: Negative for confusion.       I have reviewed the medical and surgical history, family history, social history, medications, and/or allergies, and the review of systems of this report.    Objective   Temp 97.9 °F (36.6 °C)   Resp 18   Ht 185.4 cm (72.99\")   Wt 106 kg (233 lb)   BMI 30.75 kg/m²    Physical Exam  Vitals and nursing note reviewed.   Constitutional:       Appearance: Normal appearance.   Pulmonary:      Effort: Pulmonary effort is normal.   Musculoskeletal:      Left knee: No swelling, deformity, erythema or ecchymosis. Tenderness present over the lateral joint line.   Neurological:      Mental Status: He is alert and oriented to person, place, and time.       Ortho Exam   Extremity DVT signs are negative on physical exam with negative Paulo sign, no calf pain, no palpable cords and no skin tone change   Neurologic Exam     Mental Status   Oriented to person, place, and time.            Assessment/Plan   Independent Review of Radiographic Studies:    No new imaging done today.  PROCEDURE: MRI KNEE LEFT  WO CONTRAST-        HISTORY: left knee pain; M25.562-Pain in left knee;  M22.42-Chondromalacia patellae, left knee; M25.862-Other specified joint  disorders, " left knee     COMPARISON: None.     TECHNIQUE: Multiplanar multisequence imaging of the knee was performed  without the use of intravenous contrast.     FINDINGS: The ACL, PCL, MCL and LCL are intact. There is a horizontal  tear of the anterior horn of the lateral meniscus. Medial meniscus is  intact. The extensor mechanism is intact. The popliteus tendon is  intact. The articular cartilage is preserved. There is a small joint  effusion and Baker's cyst. Bone marrow signal is homogeneous.     IMPRESSION:  Horizontal tear of the anterior horn of the lateral  meniscus.     This report was finalized on 9/7/2021 10:40 AM by Mary Jo Mackay M.D..    Large Joint Arthrocentesis: L knee  Date/Time: 9/9/2021 3:36 PM  Consent given by: patient  Site marked: site marked  Timeout: Immediately prior to procedure a time out was called to verify the correct patient, procedure, equipment, support staff and site/side marked as required   Supporting Documentation  Indications: pain   Procedure Details  Location: knee - L knee  Preparation: Patient was prepped and draped in the usual sterile fashion  Needle size: 22 G  Approach: anterolateral  Medications administered: 40 mg triamcinolone acetonide 40 MG/ML; 2 mL lidocaine 1 %  Patient tolerance: patient tolerated the procedure well with no immediate complications             Diagnoses and all orders for this visit:    1. Patella, chondromalacia, left (Primary)    2. Tear of lateral meniscus of left knee, current, unspecified tear type, initial encounter    Other orders  -     Large Joint Arthrocentesis: L knee       Orthopedic activities reviewed and patient expressed appreciation  Discussion of orthopedic goals  Risk, benefits, and merits of treatment alternatives reviewed with the patient and questions answered  Call or notify for any adverse effect from injection therapy  Reduced physical activity as appropriate  Weight bearing parameters  reviewed    Recommendations/Plan:  Exercise, medications, injections, other patient advice, and return appointment as noted.  Patient is encouraged to call or return for any issues or concerns.  Follow-up in 3 months or as needed  Patient agreeable to call or return sooner for any concerns.               EMR Dragon-transcription disclaimer:  This encounter note is an electronic transcription of spoken language to printed text.  Electronic transcription of spoken language may permit erroneous or at times nonsensical words or phrases to be inadvertently transcribed.  Although I have reviewed the note for such errors, some may still exist

## 2021-11-02 ENCOUNTER — OFFICE VISIT (OUTPATIENT)
Dept: PSYCHIATRY | Facility: CLINIC | Age: 26
End: 2021-11-02

## 2021-11-02 VITALS
HEART RATE: 64 BPM | HEIGHT: 73 IN | DIASTOLIC BLOOD PRESSURE: 64 MMHG | WEIGHT: 232 LBS | SYSTOLIC BLOOD PRESSURE: 122 MMHG | BODY MASS INDEX: 30.75 KG/M2

## 2021-11-02 DIAGNOSIS — F40.10 SOCIAL ANXIETY DISORDER: Chronic | ICD-10-CM

## 2021-11-02 DIAGNOSIS — F41.1 GENERALIZED ANXIETY DISORDER: Chronic | ICD-10-CM

## 2021-11-02 DIAGNOSIS — F32.4 MAJOR DEPRESSIVE DISORDER IN PARTIAL REMISSION, UNSPECIFIED WHETHER RECURRENT (HCC): Primary | ICD-10-CM

## 2021-11-02 PROCEDURE — 99214 OFFICE O/P EST MOD 30 MIN: CPT | Performed by: NURSE PRACTITIONER

## 2021-11-02 RX ORDER — HYDROXYZINE HYDROCHLORIDE 10 MG/1
TABLET, FILM COATED ORAL
Qty: 60 TABLET | Refills: 5 | Status: SHIPPED | OUTPATIENT
Start: 2021-11-02 | End: 2022-03-10

## 2021-11-02 NOTE — PROGRESS NOTES
"Chief Complaint  Depression, anxiety, and social anxiety         Subjective          Teodoro Nina presents to [facility] by himself for a follow up and medication check.    History of Present Illness: Teodoro states, \"I have been pretty good.\" Teodoro tells me he continues to work and go to school. He is several weeks away from the semester ending and, then, he will have one more semester left to obtain his Master's degree. 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 He is changing jobs soon and will be working nights for more pay and more time off throughout the week. He tells me he continues to go to the gym and has been going to concerts. Teodoro reports having a period in August when his mood worsened significantly. He reports having \"dark thoughts.\" He states, \"I got in a really bad place.\" He tells me his mood eventually improved; however, he tells me that he continued to be irritable and had outbursts that exceeded the situation. He tells me he decided to stop his psychiatric medications. He reports \"feeling better.\" He would like to continue using Hydroxyzine as needed for anxiety. He denies any problems with sleep or appetite. He denies any SI/HI/AVH.    Current Medications:   Current Outpatient Medications   Medication Sig Dispense Refill   • hydrOXYzine (ATARAX) 10 MG tablet Take 1 to 2 tablets PO daily prn panic 60 tablet 5     No current facility-administered medications for this visit.         Objective   Vital Signs:   /64   Pulse 64   Ht 185.4 cm (72.99\")   Wt 105 kg (232 lb)   BMI 30.62 kg/m²     Physical Exam  Vitals and nursing note reviewed.   Constitutional:       Appearance: Normal appearance. He is well-developed.   Musculoskeletal:         General: Normal range of motion.   Skin:     General: Skin is warm and dry.   Neurological:      Mental Status: He is alert and oriented to person, place, and time.   Psychiatric:         " Attention and Perception: Attention normal.         Mood and Affect: Mood normal.         Speech: Speech normal.         Behavior: Behavior normal. Behavior is cooperative.         Thought Content: Thought content normal.         Cognition and Memory: Cognition normal.         Judgment: Judgment normal.        Result Review :     The following data was reviewed by: ANEL Mcghee on 11/02/2021:    Office Visit with Maykel Dye PA-C (09/09/2021)           Assessment and Plan    Problem List Items Addressed This Visit        Mental Health    Depression - Primary    Relevant Medications    hydrOXYzine (ATARAX) 10 MG tablet      Other Visit Diagnoses     Social anxiety disorder  (Chronic)       Relevant Medications    hydrOXYzine (ATARAX) 10 MG tablet    Generalized anxiety disorder  (Chronic)       Relevant Medications    hydrOXYzine (ATARAX) 10 MG tablet          Mental Status Exam:   Hygiene:   good  Cooperation:  Cooperative  Eye Contact:  Good  Psychomotor Behavior:  Appropriate  Affect:  Appropriate  Mood: normal  Speech:  Normal  Thought Process:  Goal directed and Linear  Thought Content:  Normal  Suicidal:  None  Homicidal:  None  Hallucinations:  None  Delusion:  None  Memory:  Intact  Orientation:  Person, Place, Time and Situation  Reliability:  good  Insight:  Good  Judgement:  Good  Impulse Control:  Good  Physical/Medical Issues:  Yes Depression, anxiety, and social anxiety     PHQ-9 Score:   PHQ-9 Total Score: 7    Impression/Plan:  -This is a follow-up and medication check.  Teodoro reports a stable mood. He is not taking psychiatric medication at this time but feels he is managing well. He would like to continue as needed Hydroxyzine. He is working and finishing his Master's degree. He is also going to the gym and concerts. He will follow up in six months or as needed  -Stop Wellbutrin and Zoloft as patient is no longer taking this medication.   -Continue Atarax 10 mg 3 times  daily as needed for anxiety.         MEDS ORDERED DURING VISIT:  New Medications Ordered This Visit   Medications   • hydrOXYzine (ATARAX) 10 MG tablet     Sig: Take 1 to 2 tablets PO daily prn panic     Dispense:  60 tablet     Refill:  5         Follow Up   Return in about 6 months (around 5/2/2022) for Medication Check.  Patient was given instructions and counseling regarding his condition or for health maintenance advice. Please see specific information pulled into the AVS if appropriate.       TREATMENT PLAN/GOALS: Continue supportive psychotherapy efforts and medications as indicated. Treatment and medication options discussed during today's visit. Patient acknowledged and verbally consented to continue with current treatment plan and was educated on the importance of compliance with treatment and follow-up appointments.    MEDICATION ISSUES:  Discussed medication options and treatment plan of prescribed medication as well as the risks, benefits, and side effects including potential falls, possible impaired driving and metabolic adversities among others. Patient is agreeable to call the office with any worsening of symptoms or onset of side effects. Patient is agreeable to call 911 or go to the nearest ER should he/she begin having SI/HI.        This document has been electronically signed by ANEL Bethea, PMHNP-BC  November 2, 2021 17:13 EDT    Part of this note may be an electronic transcription/translation of spoken language to printed text using the Dragon Dictation System.

## 2022-03-10 ENCOUNTER — OFFICE VISIT (OUTPATIENT)
Dept: ORTHOPEDIC SURGERY | Facility: CLINIC | Age: 27
End: 2022-03-10

## 2022-03-10 VITALS — BODY MASS INDEX: 29.42 KG/M2 | HEIGHT: 73 IN | WEIGHT: 222 LBS

## 2022-03-10 DIAGNOSIS — M25.561 ARTHRALGIA OF RIGHT KNEE: Primary | ICD-10-CM

## 2022-03-10 PROCEDURE — 99213 OFFICE O/P EST LOW 20 MIN: CPT | Performed by: PHYSICIAN ASSISTANT

## 2022-03-10 NOTE — PROGRESS NOTES
"Subjective   Patient ID: Teodoro Nina is a 26 y.o. right hand dominant male  Pain of the Right Knee (States it has been hurting for a couple months, has been getting worse over time. No known injury.) and Pain of the Left Knee (Had an injection 9/9/2021.)         History of Present Illness    Patient presents with bilateral knee pain Left > Right. He has tried left knee cortisone therapy, knee bracing and formal PT without long term relief. He has noticed the pain hinders him from exercising and performing ADL's. The left knee will often \"lock and or give out\"  Patient mentions the right knee became bothersome several months ago without injury.    Past Medical History:   Diagnosis Date   • Anxiety         Past Surgical History:   Procedure Laterality Date   • ADENOIDECTOMY     • CHOLECYSTECTOMY     • TONSILLECTOMY         Family History   Problem Relation Age of Onset   • Anxiety disorder Mother    • Drug abuse Mother    • Bipolar disorder Father    • Depression Father    • Drug abuse Father    • Suicide Attempts Father    • Bipolar disorder Sister    • Drug abuse Sister    • ODD Nephew    • ADD / ADHD Neg Hx    • Alcohol abuse Neg Hx    • Dementia Neg Hx    • OCD Neg Hx    • Schizophrenia Neg Hx    • Paranoid behavior Neg Hx    • Self-Injurious Behavior  Neg Hx    • Seizures Neg Hx        Social History     Socioeconomic History   • Marital status: Single   • Number of children: 0   • Highest education level: Some college, no degree   Tobacco Use   • Smoking status: Former Smoker     Packs/day: 0.25     Types: Electronic Cigarette   • Smokeless tobacco: Never Used   Vaping Use   • Vaping Use: Every day   • Substances: Nicotine   • Devices: Pre-filled pod   Substance and Sexual Activity   • Alcohol use: Yes     Alcohol/week: 1.0 standard drink     Types: 1 Shots of liquor per week     Comment: OCCASIONAL   • Drug use: No   • Sexual activity: Yes     Partners: Male     Birth control/protection: None       No current " "outpatient medications on file.    No Known Allergies    Review of Systems   Constitutional: Negative for fever.   HENT: Negative for dental problem and voice change.    Eyes: Negative for visual disturbance.   Respiratory: Negative for shortness of breath.    Cardiovascular: Negative for chest pain.   Gastrointestinal: Negative for abdominal pain.   Genitourinary: Negative for dysuria.   Musculoskeletal: Positive for arthralgias (bilateral knee) and joint swelling (bilateral knee). Negative for gait problem.   Skin: Negative for rash.   Neurological: Negative for speech difficulty.   Hematological: Does not bruise/bleed easily.   Psychiatric/Behavioral: Negative for confusion.       I have reviewed the medical and surgical history, family history, social history, medications, and/or allergies, and the review of systems of this report.    Objective   Ht 185.4 cm (72.99\")   Wt 101 kg (222 lb)   BMI 29.30 kg/m²    Physical Exam  Vitals and nursing note reviewed.   Constitutional:       Appearance: Normal appearance.   Cardiovascular:      Rate and Rhythm: Normal rate.      Pulses: Normal pulses.   Pulmonary:      Effort: Pulmonary effort is normal. No respiratory distress.   Abdominal:      General: There is no distension.   Musculoskeletal:      Left knee: Crepitus present. Abnormal patellar mobility.      Instability Tests: Lateral Oriana test positive. Medial Oriana test negative.   Neurological:      Mental Status: He is alert and oriented to person, place, and time.   Psychiatric:         Behavior: Behavior normal.       Left Knee Exam     Range of Motion   Extension: 0   Flexion: 120     Tests   Oriana:  Medial - negative Lateral - positive  Varus: negative Valgus: negative  Drawer:  Anterior - negative     Posterior - negative  Patellar apprehension: trace    Other   Erythema: absent  Sensation: normal  Pulse: present           Extremity DVT signs are negative on physical exam with negative Paulo sign, " no calf pain, no palpable cords and no skin tone change   Neurologic Exam     Mental Status   Oriented to person, place, and time.              Assessment/Plan   Independent Review of Radiographic Studies:    X-ray of the right knee 2 view weightbearing performed in the office for evaluation of knee pain.  No comparison films available to review.  No acute fracture or dislocation.  There does appear to be slight abnormal patella tilt.    Reviewed imaging with patient at a prior visit.  Prior right knee x-ray images reveals abnormal lateral patella tilt to the left knee  PROCEDURE: MRI KNEE LEFT  WO CONTRAST-        HISTORY: left knee pain; M25.562-Pain in left knee;  M22.42-Chondromalacia patellae, left knee; M25.862-Other specified joint  disorders, left knee     COMPARISON: None.     TECHNIQUE: Multiplanar multisequence imaging of the knee was performed  without the use of intravenous contrast.     FINDINGS: The ACL, PCL, MCL and LCL are intact. There is a horizontal  tear of the anterior horn of the lateral meniscus. Medial meniscus is  intact. The extensor mechanism is intact. The popliteus tendon is  intact. The articular cartilage is preserved. There is a small joint  effusion and Baker's cyst. Bone marrow signal is homogeneous.     IMPRESSION:  Horizontal tear of the anterior horn of the lateral  meniscus.     This report was finalized on 9/7/2021 10:40 AM by Mary Jo Mackay M.D..  Procedures       Diagnoses and all orders for this visit:    1. Arthralgia of right knee (Primary)  -     XR Knee 3 View Right; Future       The nature of the proposed surgery reviewed with the patient including risks, benefits, rehabilitation, recovery timeframe, and outcome expectations  Ice, heat, and/or modalities as beneficial    Recommendations/Plan:  Exercise, medications, injections, other patient advice, and return appointment as noted.  Patient is encouraged to call or return for any issues or concerns.    We  discussed the option of repeating the cortisone injection with additional therapy versus the elective knee arthroscopy.  Patient would like to proceed with knee arthroscopy so he could resume working on his fitness and overall wellbeing    Plan: Left knee arthroscopy, partial lateral meniscectomy, lateral retinacular release and related procedures    Patient agreeable to call or return sooner for any concerns.                 EMR Dragon-transcription disclaimer:  This encounter note is an electronic transcription of spoken language to printed text.  Electronic transcription of spoken language may permit erroneous or at times nonsensical words or phrases to be inadvertently transcribed.  Although I have reviewed the note for such errors, some may still exist

## 2022-03-29 ENCOUNTER — PREP FOR SURGERY (OUTPATIENT)
Dept: OTHER | Facility: HOSPITAL | Age: 27
End: 2022-03-29

## 2022-03-29 DIAGNOSIS — M25.862 PATELLOFEMORAL DYSFUNCTION, LEFT: ICD-10-CM

## 2022-03-29 DIAGNOSIS — S83.282A TEAR OF LATERAL MENISCUS OF LEFT KNEE, CURRENT, UNSPECIFIED TEAR TYPE, INITIAL ENCOUNTER: ICD-10-CM

## 2022-03-29 DIAGNOSIS — M22.42 PATELLA, CHONDROMALACIA, LEFT: Primary | ICD-10-CM

## 2022-03-29 RX ORDER — CEFAZOLIN SODIUM 2 G/50ML
2 SOLUTION INTRAVENOUS
Status: CANCELLED | OUTPATIENT
Start: 2022-03-30 | End: 2022-03-31

## 2022-04-11 ENCOUNTER — PRE-ADMISSION TESTING (OUTPATIENT)
Dept: PREADMISSION TESTING | Facility: HOSPITAL | Age: 27
End: 2022-04-11

## 2022-04-11 ENCOUNTER — HOSPITAL ENCOUNTER (OUTPATIENT)
Dept: GENERAL RADIOLOGY | Facility: HOSPITAL | Age: 27
Discharge: HOME | End: 2022-04-11

## 2022-04-11 VITALS — WEIGHT: 220.6 LBS | BODY MASS INDEX: 29.88 KG/M2 | HEIGHT: 72 IN

## 2022-04-11 DIAGNOSIS — M25.862 PATELLOFEMORAL DYSFUNCTION, LEFT: ICD-10-CM

## 2022-04-11 DIAGNOSIS — S83.282A TEAR OF LATERAL MENISCUS OF LEFT KNEE, CURRENT, UNSPECIFIED TEAR TYPE, INITIAL ENCOUNTER: ICD-10-CM

## 2022-04-11 DIAGNOSIS — M22.42 PATELLA, CHONDROMALACIA, LEFT: ICD-10-CM

## 2022-04-11 LAB
ALBUMIN SERPL-MCNC: 4.8 G/DL (ref 3.5–5.2)
ALBUMIN/GLOB SERPL: 2.2 G/DL
ALP SERPL-CCNC: 76 U/L (ref 39–117)
ALT SERPL W P-5'-P-CCNC: 16 U/L (ref 1–41)
ANION GAP SERPL CALCULATED.3IONS-SCNC: 9.9 MMOL/L (ref 5–15)
AST SERPL-CCNC: 18 U/L (ref 1–40)
BASOPHILS # BLD AUTO: 0.03 10*3/MM3 (ref 0–0.2)
BASOPHILS NFR BLD AUTO: 0.3 % (ref 0–1.5)
BILIRUB SERPL-MCNC: 1.4 MG/DL (ref 0–1.2)
BILIRUB UR QL STRIP: NEGATIVE
BUN SERPL-MCNC: 14 MG/DL (ref 6–20)
BUN/CREAT SERPL: 20.3 (ref 7–25)
CALCIUM SPEC-SCNC: 9.6 MG/DL (ref 8.6–10.5)
CHLORIDE SERPL-SCNC: 98 MMOL/L (ref 98–107)
CLARITY UR: CLEAR
CO2 SERPL-SCNC: 27.1 MMOL/L (ref 22–29)
COLOR UR: YELLOW
CREAT SERPL-MCNC: 0.69 MG/DL (ref 0.76–1.27)
DEPRECATED RDW RBC AUTO: 38.6 FL (ref 37–54)
EGFRCR SERPLBLD CKD-EPI 2021: 132.5 ML/MIN/1.73
EOSINOPHIL # BLD AUTO: 0.12 10*3/MM3 (ref 0–0.4)
EOSINOPHIL NFR BLD AUTO: 1.3 % (ref 0.3–6.2)
ERYTHROCYTE [DISTWIDTH] IN BLOOD BY AUTOMATED COUNT: 12.2 % (ref 12.3–15.4)
GLOBULIN UR ELPH-MCNC: 2.2 GM/DL
GLUCOSE SERPL-MCNC: 89 MG/DL (ref 65–99)
GLUCOSE UR STRIP-MCNC: NEGATIVE MG/DL
HCT VFR BLD AUTO: 45.2 % (ref 37.5–51)
HGB BLD-MCNC: 15.7 G/DL (ref 13–17.7)
HGB UR QL STRIP.AUTO: NEGATIVE
IMM GRANULOCYTES # BLD AUTO: 0.04 10*3/MM3 (ref 0–0.05)
IMM GRANULOCYTES NFR BLD AUTO: 0.4 % (ref 0–0.5)
KETONES UR QL STRIP: NEGATIVE
LEUKOCYTE ESTERASE UR QL STRIP.AUTO: NEGATIVE
LYMPHOCYTES # BLD AUTO: 3.5 10*3/MM3 (ref 0.7–3.1)
LYMPHOCYTES NFR BLD AUTO: 37 % (ref 19.6–45.3)
MCH RBC QN AUTO: 30.1 PG (ref 26.6–33)
MCHC RBC AUTO-ENTMCNC: 34.7 G/DL (ref 31.5–35.7)
MCV RBC AUTO: 86.8 FL (ref 79–97)
MONOCYTES # BLD AUTO: 0.71 10*3/MM3 (ref 0.1–0.9)
MONOCYTES NFR BLD AUTO: 7.5 % (ref 5–12)
NEUTROPHILS NFR BLD AUTO: 5.07 10*3/MM3 (ref 1.7–7)
NEUTROPHILS NFR BLD AUTO: 53.5 % (ref 42.7–76)
NITRITE UR QL STRIP: NEGATIVE
NRBC BLD AUTO-RTO: 0 /100 WBC (ref 0–0.2)
PH UR STRIP.AUTO: 6.5 [PH] (ref 5–8)
PLATELET # BLD AUTO: 258 10*3/MM3 (ref 140–450)
PMV BLD AUTO: 9.8 FL (ref 6–12)
POTASSIUM SERPL-SCNC: 4.3 MMOL/L (ref 3.5–5.2)
PROT SERPL-MCNC: 7 G/DL (ref 6–8.5)
PROT UR QL STRIP: NEGATIVE
RBC # BLD AUTO: 5.21 10*6/MM3 (ref 4.14–5.8)
SODIUM SERPL-SCNC: 135 MMOL/L (ref 136–145)
SP GR UR STRIP: 1.01 (ref 1–1.03)
UROBILINOGEN UR QL STRIP: NORMAL
WBC NRBC COR # BLD: 9.47 10*3/MM3 (ref 3.4–10.8)

## 2022-04-11 PROCEDURE — 93005 ELECTROCARDIOGRAM TRACING: CPT

## 2022-04-11 PROCEDURE — 80053 COMPREHEN METABOLIC PANEL: CPT

## 2022-04-11 PROCEDURE — 81003 URINALYSIS AUTO W/O SCOPE: CPT

## 2022-04-11 PROCEDURE — 71046 X-RAY EXAM CHEST 2 VIEWS: CPT

## 2022-04-11 PROCEDURE — 93010 ELECTROCARDIOGRAM REPORT: CPT | Performed by: INTERNAL MEDICINE

## 2022-04-11 PROCEDURE — 87081 CULTURE SCREEN ONLY: CPT

## 2022-04-11 PROCEDURE — 36415 COLL VENOUS BLD VENIPUNCTURE: CPT

## 2022-04-11 PROCEDURE — 85025 COMPLETE CBC W/AUTO DIFF WBC: CPT

## 2022-04-11 RX ORDER — IBUPROFEN 200 MG
200 TABLET ORAL EVERY 6 HOURS PRN
COMMUNITY

## 2022-04-11 RX ORDER — DIPHENOXYLATE HYDROCHLORIDE AND ATROPINE SULFATE 2.5; .025 MG/1; MG/1
TABLET ORAL DAILY
COMMUNITY

## 2022-04-12 ENCOUNTER — OFFICE VISIT (OUTPATIENT)
Dept: INTERNAL MEDICINE | Facility: CLINIC | Age: 27
End: 2022-04-12

## 2022-04-12 VITALS
OXYGEN SATURATION: 99 % | SYSTOLIC BLOOD PRESSURE: 124 MMHG | DIASTOLIC BLOOD PRESSURE: 72 MMHG | HEIGHT: 72 IN | HEART RATE: 74 BPM | WEIGHT: 220 LBS | TEMPERATURE: 97.5 F | BODY MASS INDEX: 29.8 KG/M2

## 2022-04-12 DIAGNOSIS — N62 GYNECOMASTIA: ICD-10-CM

## 2022-04-12 DIAGNOSIS — F32.4 MAJOR DEPRESSIVE DISORDER IN PARTIAL REMISSION, UNSPECIFIED WHETHER RECURRENT: ICD-10-CM

## 2022-04-12 DIAGNOSIS — Z01.818 PREOPERATIVE CLEARANCE: ICD-10-CM

## 2022-04-12 DIAGNOSIS — R17 TOTAL BILIRUBIN, ELEVATED: Primary | ICD-10-CM

## 2022-04-12 DIAGNOSIS — F41.9 ANXIETY: ICD-10-CM

## 2022-04-12 LAB
MRSA SPEC QL CULT: NORMAL
QT INTERVAL: 390 MS
QTC INTERVAL: 379 MS

## 2022-04-12 PROCEDURE — 99214 OFFICE O/P EST MOD 30 MIN: CPT | Performed by: INTERNAL MEDICINE

## 2022-04-12 NOTE — PROGRESS NOTES
Subjective   Teodoro Nina is a 26 y.o. male.     Chief Complaint   Patient presents with   • Follow-up     Recent lab results   • Depression       History of Present Illness       Current Outpatient Medications:   •  ibuprofen (ADVIL,MOTRIN) 200 MG tablet, Take 200 mg by mouth Every 6 (Six) Hours As Needed for Mild Pain ., Disp: , Rfl:   •  multivitamin (THERAGRAN) tablet tablet, Take  by mouth Daily., Disp: , Rfl:     The following portions of the patient's history were reviewed and updated as appropriate: allergies, current medications, past family history, past medical history, past social history, past surgical history and problem list.    Review of Systems   Constitutional: Negative.  Negative for chills and fever.   HENT: Negative.  Negative for congestion.    Eyes: Negative.    Respiratory: Negative.  Negative for chest tightness, shortness of breath and wheezing.    Cardiovascular: Negative.  Negative for chest pain, palpitations and leg swelling.   Gastrointestinal: Positive for diarrhea (two times a week) and nausea. Negative for abdominal distention, abdominal pain, constipation and vomiting.   Endocrine: Negative.    Genitourinary: Negative.    Musculoskeletal: Negative.    Skin: Negative.  Negative for pallor and rash.   Allergic/Immunologic: Negative.    Neurological: Negative.    Hematological: Negative.    Psychiatric/Behavioral: Positive for dysphoric mood. Negative for agitation. The patient is nervous/anxious.    All other systems reviewed and are negative.      Objective   Physical Exam  HENT:      Head: Normocephalic and atraumatic.   Cardiovascular:      Rate and Rhythm: Normal rate and regular rhythm.      Pulses: Normal pulses.      Heart sounds: Normal heart sounds. No murmur heard.    No gallop.   Pulmonary:      Effort: Pulmonary effort is normal. No respiratory distress.      Breath sounds: Normal breath sounds. No wheezing or rales.   Abdominal:      General: Abdomen is flat. Bowel sounds  are normal. There is no distension.      Palpations: Abdomen is soft. There is no mass.      Tenderness: There is abdominal tenderness (Above umbillical area).      Hernia: No hernia is present.   Musculoskeletal:         General: Swelling (Left knee) and tenderness (Left knee tender to palpation) present. No deformity or signs of injury. Normal range of motion.      Cervical back: Neck supple.      Right lower leg: No edema.      Left lower leg: No edema.   Skin:     General: Skin is warm.      Capillary Refill: Capillary refill takes less than 2 seconds.   Neurological:      General: No focal deficit present.      Mental Status: He is alert and oriented to person, place, and time. Mental status is at baseline.      Cranial Nerves: No cranial nerve deficit.   Psychiatric:         Behavior: Behavior normal.         Thought Content: Thought content normal.         Judgment: Judgment normal.         All tests have been reviewed.    Assessment/Plan   Diagnoses and all orders for this visit:    Total bilirubin, elevated  - S/P cholecystectomy 2014   - Avoid greasy foods      Preoperative clearance, patient is clear for left knee arthroscopy surgery.     Anxiety, improved, not on medication.     Major depressive disorder, improved, not on medication.     Gynecomastia, did not do labs   -     Prolactin  -     Testosterone, Free, Total  -     Estradiol  -     CBC & Differential  -     Comprehensive Metabolic Panel  -     Lipid Panel  -     TSH           gall bladder removed

## 2022-04-12 NOTE — PROGRESS NOTES
Subjective   Teodoro Nina is a 26 y.o. male who presents to the office today for a preoperative consultation at the request of surgeon  who plans on performing left knee meniscus tear cleaning on April 14. This consultation is requested for the specific conditions prompting preoperative evaluation (i.e. because of potential affect on operative risk): NA. Planned anesthesia: general. The patient has the following known anesthesia issues: NA. Patients bleeding risk: no recent abnormal bleeding. Patient does not have objections to receiving blood products if needed.    The following portions of the patient's history were reviewed and updated as appropriate: allergies, current medications, past family history, past medical history, past social history, past surgical history and problem list.    Review of Systems  Review of Systems   Constitutional: Negative.    Respiratory: Negative.    Cardiovascular: Negative.    Gastrointestinal: Negative.    Musculoskeletal: Positive for arthralgias.   Skin: Negative.    Neurological: Negative.    Psychiatric/Behavioral: Negative.        Objective   Physical Exam  Cardiovascular:      Rate and Rhythm: Normal rate and regular rhythm.      Heart sounds: Normal heart sounds.   Pulmonary:      Effort: Pulmonary effort is normal.      Breath sounds: Normal breath sounds.   Abdominal:      General: Bowel sounds are normal.   Musculoskeletal:         General: Tenderness present.      Cervical back: Neck supple.   Skin:     General: Skin is warm.   Neurological:      Mental Status: He is alert and oriented to person, place, and time.         All  tests have been reviewed.    Cardiographics  ECG: normal sinus rhythm, no blocks or conduction defects, no ischemic changes  Echocardiogram: not done    Imaging  Chest x-ray: normal     Lab Review   Lab Results   Component Value Date     (L) 04/11/2022    K 4.3 04/11/2022    CL 98 04/11/2022    CO2 27.1 04/11/2022    BUN 14 04/11/2022     CREATININE 0.69 (L) 04/11/2022    GLUCOSE 89 04/11/2022    CALCIUM 9.6 04/11/2022       Assessment/Plan   26 y.o. male with planned surgery as above.    Known risk factors for perioperative complications: None        Cardiac Risk Estimation: NA    Current medications which may produce withdrawal symptoms if withheld perioperatively: NA    1. Preoperative workup as follows chest x-ray, ECG, hemoglobin, hematocrit, electrolytes, creatinine, glucose, liver function studies.  2. Change in medication regimen before surgery: none, continue medication regimen including morning of surgery, with sip of water.  3. Prophylaxis for cardiac events with perioperative beta-blockers: not indicated.  4. Deep vein thrombosis prophylaxis postoperatively:regimen to be chosen by surgical team.                Cleared for left knee surgery   Class 1 obesity without serious comorbidity with body mass index (BMI) of 31.0 to 31.9 in adult, unspecified obesity type continue good diet weight loss     Gynecomastia check  Patient yet to   -     Prolactin  -     Testosterone, Free, Total  -     Estradiol  -     CBC & Differential  -     Comprehensive Metabolic Panel  -     Lipid Panel  -     TSH     Depression, anxiety stable without patient self d/c medicine     3 mo PE after labs

## 2022-04-13 ENCOUNTER — OFFICE VISIT (OUTPATIENT)
Dept: ORTHOPEDIC SURGERY | Facility: CLINIC | Age: 27
End: 2022-04-13

## 2022-04-13 VITALS — BODY MASS INDEX: 29.8 KG/M2 | TEMPERATURE: 97.6 F | HEIGHT: 72 IN | WEIGHT: 220 LBS

## 2022-04-13 DIAGNOSIS — M25.562 ARTHRALGIA OF LEFT KNEE: ICD-10-CM

## 2022-04-13 DIAGNOSIS — M22.42 PATELLA, CHONDROMALACIA, LEFT: ICD-10-CM

## 2022-04-13 DIAGNOSIS — M25.862 PATELLOFEMORAL DYSFUNCTION, LEFT: ICD-10-CM

## 2022-04-13 DIAGNOSIS — S83.282A TEAR OF LATERAL MENISCUS OF LEFT KNEE, CURRENT, UNSPECIFIED TEAR TYPE, INITIAL ENCOUNTER: ICD-10-CM

## 2022-04-13 DIAGNOSIS — M25.561 ARTHRALGIA OF RIGHT KNEE: Primary | ICD-10-CM

## 2022-04-13 DIAGNOSIS — S83.282A TEAR OF LATERAL MENISCUS OF LEFT KNEE, CURRENT, UNSPECIFIED TEAR TYPE, INITIAL ENCOUNTER: Primary | ICD-10-CM

## 2022-04-13 PROCEDURE — S0260 H&P FOR SURGERY: HCPCS | Performed by: PHYSICIAN ASSISTANT

## 2022-04-13 NOTE — H&P (VIEW-ONLY)
"Teodoro Nina is a 26 y.o. male   Pre op exam of the left knee       CHIEF COMPLAINT:  Pre op for left knee ATS      History of Present Illness      Patient presents with left knee pain that has been ongoing for the last 6 months.  He denies any specific injury or trauma.  He has tried left knee cortisone injection, knee bracing and formal physical therapy without significant relief.  The pain is starting to interfere with his ability to exercise and perform daily activities.  He notes the left knee will often \"lock up \"    PAST MEDICAL HISTORY:    Past Medical History:   Diagnosis Date   • Anxiety    • MRSA (methicillin resistant staph aureus) culture positive 2008    right leg txt'd.   • Seasonal allergies          Current Outpatient Medications:   •  ibuprofen (ADVIL,MOTRIN) 200 MG tablet, Take 200 mg by mouth Every 6 (Six) Hours As Needed for Mild Pain ., Disp: , Rfl:   •  multivitamin (THERAGRAN) tablet tablet, Take  by mouth Daily., Disp: , Rfl:     Past Surgical History:   Procedure Laterality Date   • ADENOIDECTOMY     • CHOLECYSTECTOMY     • TONSILLECTOMY         Family History   Problem Relation Age of Onset   • Anxiety disorder Mother    • Drug abuse Mother    • Bipolar disorder Father    • Depression Father    • Drug abuse Father    • Suicide Attempts Father    • Bipolar disorder Sister    • Drug abuse Sister    • ODD Nephew    • ADD / ADHD Neg Hx    • Alcohol abuse Neg Hx    • Dementia Neg Hx    • OCD Neg Hx    • Schizophrenia Neg Hx    • Paranoid behavior Neg Hx    • Self-Injurious Behavior  Neg Hx    • Seizures Neg Hx        Social History     Socioeconomic History   • Marital status: Single   • Number of children: 0   • Highest education level: Some college, no degree   Tobacco Use   • Smoking status: Former Smoker     Packs/day: 0.25     Types: Electronic Cigarette   • Smokeless tobacco: Never Used   Vaping Use   • Vaping Use: Every day   • Substances: Nicotine, Flavoring   • Devices: Pre-filled pod " "  Substance and Sexual Activity   • Alcohol use: Not Currently     Alcohol/week: 1.0 standard drink     Types: 1 Shots of liquor per week     Comment: OCCASIONAL   • Drug use: No   • Sexual activity: Defer     Partners: Male     Birth control/protection: None        No Known Allergies    Review of Systems   Constitutional: Negative for fever.   HENT: Negative for dental problem and voice change.    Eyes: Negative for visual disturbance.   Respiratory: Negative for shortness of breath.    Cardiovascular: Negative for chest pain.   Gastrointestinal: Negative for abdominal pain.   Genitourinary: Negative for dysuria.   Musculoskeletal: Positive for arthralgias (left knee) and joint swelling (left knee). Negative for gait problem.   Skin: Negative for rash.   Neurological: Negative for speech difficulty.   Hematological: Does not bruise/bleed easily.   Psychiatric/Behavioral: Negative for confusion.       I have reviewed the medical and surgical history, family history, social history, medications, and/or allergies, and the review of systems of this report.    PHYSICAL EXAMINATION:       Temp 97.6 °F (36.4 °C)   Ht 182.9 cm (72.01\")   Wt 99.8 kg (220 lb)   BMI 29.83 kg/m²     GENERAL [x] Well developed  []Ill appearing [x] No acute distress    HEENT [x]No acute changes [x] Normocephalic, atraumatic    NECK [x]Supple  [] No midline tenderness    LUNGS [x]Clear bilaterally [x]No wheezes []Rhonchi [] Rales    HEART [x] Regular rate  [x] Regular rhythm [] Irregular    ABDOMEN [x] Soft [x] Not tender [x] Not distended [x] Normal sounds    VAS/EXT [x] Normal Pulses []Edema []Cyanosis             SKIN [x] Warm [x]Dry []Pink []Ecchymosis []Cool    NEURO [x] Sensation Intact [x] Motor Intact [x] Pulse intact  [] Dysesthesias:_________  []Weakness:__________             Physical Exam  Musculoskeletal:      Left knee:      Instability Tests: Lateral Oriana test positive. Medial Oriana test negative.       Left Knee Exam "     Range of Motion   Extension: 5   Flexion: 110     Tests   Oriana:  Medial - negative Lateral - positive  Varus: negative Valgus: negative  Drawer:  Anterior - negative     Posterior - negative    Other   Erythema: absent           Neurologic Exam             Independent Review of Radiographic Studies:    No new imaging done today.    X-ray of the right knee 3 view weightbearing performed in the office for the evaluation of knee pain.  No comparison films available to review.  No acute fracture dislocation.  Does appear to be slight abnormal patella tilt     Narrative & Impression   PROCEDURE: MRI KNEE LEFT  WO CONTRAST-        HISTORY: left knee pain; M25.562-Pain in left knee;  M22.42-Chondromalacia patellae, left knee; M25.862-Other specified joint  disorders, left knee     COMPARISON: None.     TECHNIQUE: Multiplanar multisequence imaging of the knee was performed  without the use of intravenous contrast.     FINDINGS: The ACL, PCL, MCL and LCL are intact. There is a horizontal  tear of the anterior horn of the lateral meniscus. Medial meniscus is  intact. The extensor mechanism is intact. The popliteus tendon is  intact. The articular cartilage is preserved. There is a small joint  effusion and Baker's cyst. Bone marrow signal is homogeneous.     IMPRESSION:  Horizontal tear of the anterior horn of the lateral  meniscus.     This report was finalized on 9/7/2021 10:40 AM by Mary Jo Mackay M.D..       Diagnoses and all orders for this visit:    1. Tear of lateral meniscus of left knee, current, unspecified tear type, initial encounter (Primary)    2. Arthralgia of left knee    3. Patellofemoral dysfunction, left        SPECIAL INSTRUCTIONS:  Multi-modal analgesia.  Multi-modal DVT prophylaxis.  Rehabilitation PT/OT planned.    Assessment/Plan:  The nature of the proposed surgery reviewed with the patient including risks, benefits, rehabilitation, recovery timeframe, and outcome expectations  The nature  of the proposed surgery reviewed with the patient including risks, benefits, rehabilitation, recovery timeframe, and outcome expectations  Weight bearing parameters reviewed          Risks, benefits, and alternative treatments discussed with the patient: [x] Yes [] No    Risk benefits and merits of the proposed surgery were discussed and the patient's questions were answered risks discussed including and not limited to:  Anesthesia reactions  Infection  Deep venous thrombosis and pulmonary embolus  Nerve, vascular or tendon injury  Fracture  Deformity  Stiffness  Weakness  Skin necrosis  Revision surgery or further treatment  Recurrence of problem and condition     Informed consent: [] Signed  [x] To be obtained at hospital  [] Both    Recommendations/Plan:        PLANNED SURGICAL PROCEDURE: Plan: Left knee arthroscopy, partial lateral meniscectomy, lateral retinacular release and related procedures    Patient is encouraged and agreeable to call or return sooner for any issues or concerns.

## 2022-04-14 ENCOUNTER — HOSPITAL ENCOUNTER (OUTPATIENT)
Facility: HOSPITAL | Age: 27
Setting detail: HOSPITAL OUTPATIENT SURGERY
Discharge: HOME | End: 2022-04-14
Attending: ORTHOPAEDIC SURGERY | Admitting: ORTHOPAEDIC SURGERY

## 2022-04-14 ENCOUNTER — ANESTHESIA EVENT (OUTPATIENT)
Dept: PERIOP | Facility: HOSPITAL | Age: 27
End: 2022-04-14

## 2022-04-14 ENCOUNTER — ANESTHESIA (OUTPATIENT)
Dept: PERIOP | Facility: HOSPITAL | Age: 27
End: 2022-04-14

## 2022-04-14 VITALS
HEART RATE: 67 BPM | RESPIRATION RATE: 17 BRPM | OXYGEN SATURATION: 98 % | DIASTOLIC BLOOD PRESSURE: 76 MMHG | TEMPERATURE: 97.1 F | SYSTOLIC BLOOD PRESSURE: 129 MMHG

## 2022-04-14 DIAGNOSIS — M22.42 PATELLA, CHONDROMALACIA, LEFT: ICD-10-CM

## 2022-04-14 DIAGNOSIS — S83.282A TEAR OF LATERAL MENISCUS OF LEFT KNEE, CURRENT, UNSPECIFIED TEAR TYPE, INITIAL ENCOUNTER: ICD-10-CM

## 2022-04-14 DIAGNOSIS — M25.862 PATELLOFEMORAL DYSFUNCTION, LEFT: ICD-10-CM

## 2022-04-14 PROCEDURE — 25010000002 PROPOFOL 200 MG/20ML EMULSION: Performed by: NURSE ANESTHETIST, CERTIFIED REGISTERED

## 2022-04-14 PROCEDURE — 29873 ARTHRS KNEE SURG W/LAT RLS: CPT | Performed by: ORTHOPAEDIC SURGERY

## 2022-04-14 PROCEDURE — 25010000002 ROPIVACAINE PER 1 MG: Performed by: ORTHOPAEDIC SURGERY

## 2022-04-14 PROCEDURE — 25010000002 EPINEPHRINE PER 0.1 MG: Performed by: ORTHOPAEDIC SURGERY

## 2022-04-14 PROCEDURE — 25010000002 DEXAMETHASONE PER 1 MG: Performed by: NURSE ANESTHETIST, CERTIFIED REGISTERED

## 2022-04-14 PROCEDURE — 29881 ARTHRS KNE SRG MNISECTMY M/L: CPT | Performed by: ORTHOPAEDIC SURGERY

## 2022-04-14 PROCEDURE — 25010000002 MIDAZOLAM PER 1MG: Performed by: NURSE ANESTHETIST, CERTIFIED REGISTERED

## 2022-04-14 PROCEDURE — 0 CEFAZOLIN SODIUM-DEXTROSE 2-3 GM-%(50ML) RECONSTITUTED SOLUTION: Performed by: ORTHOPAEDIC SURGERY

## 2022-04-14 PROCEDURE — 25010000002 FENTANYL CITRATE (PF) 100 MCG/2ML SOLUTION: Performed by: NURSE ANESTHETIST, CERTIFIED REGISTERED

## 2022-04-14 RX ORDER — DEXAMETHASONE SODIUM PHOSPHATE 4 MG/ML
INJECTION, SOLUTION INTRA-ARTICULAR; INTRALESIONAL; INTRAMUSCULAR; INTRAVENOUS; SOFT TISSUE AS NEEDED
Status: DISCONTINUED | OUTPATIENT
Start: 2022-04-14 | End: 2022-04-14 | Stop reason: SURG

## 2022-04-14 RX ORDER — PROPOFOL 10 MG/ML
INJECTION, EMULSION INTRAVENOUS AS NEEDED
Status: DISCONTINUED | OUTPATIENT
Start: 2022-04-14 | End: 2022-04-14 | Stop reason: SURG

## 2022-04-14 RX ORDER — HYDROCODONE BITARTRATE AND ACETAMINOPHEN 7.5; 325 MG/1; MG/1
1 TABLET ORAL EVERY 8 HOURS PRN
Qty: 21 TABLET | Refills: 0 | Status: SHIPPED | OUTPATIENT
Start: 2022-04-14 | End: 2022-04-28

## 2022-04-14 RX ORDER — ROPIVACAINE HYDROCHLORIDE 5 MG/ML
INJECTION, SOLUTION EPIDURAL; INFILTRATION; PERINEURAL AS NEEDED
Status: DISCONTINUED | OUTPATIENT
Start: 2022-04-14 | End: 2022-04-14 | Stop reason: HOSPADM

## 2022-04-14 RX ORDER — MIDAZOLAM HYDROCHLORIDE 2 MG/2ML
INJECTION, SOLUTION INTRAMUSCULAR; INTRAVENOUS AS NEEDED
Status: DISCONTINUED | OUTPATIENT
Start: 2022-04-14 | End: 2022-04-14 | Stop reason: SURG

## 2022-04-14 RX ORDER — CEFAZOLIN SODIUM 2 G/50ML
2 SOLUTION INTRAVENOUS
Status: DISCONTINUED | OUTPATIENT
Start: 2022-04-15 | End: 2022-04-14

## 2022-04-14 RX ORDER — SODIUM CHLORIDE, SODIUM LACTATE, POTASSIUM CHLORIDE, CALCIUM CHLORIDE 600; 310; 30; 20 MG/100ML; MG/100ML; MG/100ML; MG/100ML
1000 INJECTION, SOLUTION INTRAVENOUS CONTINUOUS
Status: DISCONTINUED | OUTPATIENT
Start: 2022-04-14 | End: 2022-04-14 | Stop reason: HOSPADM

## 2022-04-14 RX ORDER — CEFAZOLIN SODIUM 2 G/50ML
2 SOLUTION INTRAVENOUS
Status: COMPLETED | OUTPATIENT
Start: 2022-04-14 | End: 2022-04-14

## 2022-04-14 RX ORDER — FENTANYL CITRATE 50 UG/ML
INJECTION, SOLUTION INTRAMUSCULAR; INTRAVENOUS AS NEEDED
Status: DISCONTINUED | OUTPATIENT
Start: 2022-04-14 | End: 2022-04-14 | Stop reason: SURG

## 2022-04-14 RX ORDER — ONDANSETRON 2 MG/ML
4 INJECTION INTRAMUSCULAR; INTRAVENOUS ONCE AS NEEDED
Status: DISCONTINUED | OUTPATIENT
Start: 2022-04-14 | End: 2022-04-14 | Stop reason: HOSPADM

## 2022-04-14 RX ORDER — LIDOCAINE HYDROCHLORIDE AND EPINEPHRINE 10; 10 MG/ML; UG/ML
INJECTION, SOLUTION INFILTRATION; PERINEURAL AS NEEDED
Status: DISCONTINUED | OUTPATIENT
Start: 2022-04-14 | End: 2022-04-14 | Stop reason: HOSPADM

## 2022-04-14 RX ORDER — LIDOCAINE HYDROCHLORIDE 20 MG/ML
INJECTION, SOLUTION INTRAVENOUS AS NEEDED
Status: DISCONTINUED | OUTPATIENT
Start: 2022-04-14 | End: 2022-04-14 | Stop reason: SURG

## 2022-04-14 RX ADMIN — SODIUM CHLORIDE, POTASSIUM CHLORIDE, SODIUM LACTATE AND CALCIUM CHLORIDE 1000 ML: 600; 310; 30; 20 INJECTION, SOLUTION INTRAVENOUS at 09:04

## 2022-04-14 RX ADMIN — FAMOTIDINE 20 MG: 10 INJECTION INTRAVENOUS at 09:04

## 2022-04-14 RX ADMIN — PROPOFOL 100 MG: 10 INJECTION, EMULSION INTRAVENOUS at 10:02

## 2022-04-14 RX ADMIN — FENTANYL CITRATE 100 MCG: 50 INJECTION INTRAMUSCULAR; INTRAVENOUS at 10:01

## 2022-04-14 RX ADMIN — LIDOCAINE HYDROCHLORIDE 100 MG: 20 INJECTION, SOLUTION INTRAVENOUS at 10:01

## 2022-04-14 RX ADMIN — CEFAZOLIN SODIUM 2 G: 2 SOLUTION INTRAVENOUS at 09:58

## 2022-04-14 RX ADMIN — PROPOFOL 200 MG: 10 INJECTION, EMULSION INTRAVENOUS at 10:01

## 2022-04-14 RX ADMIN — MIDAZOLAM HYDROCHLORIDE 2 MG: 1 INJECTION, SOLUTION INTRAMUSCULAR; INTRAVENOUS at 09:58

## 2022-04-14 RX ADMIN — GLYCOPYRROLATE 0.2 MCG: 0.2 INJECTION, SOLUTION INTRAMUSCULAR; INTRAVENOUS at 10:28

## 2022-04-14 RX ADMIN — DEXAMETHASONE SODIUM PHOSPHATE 8 MG: 4 INJECTION, SOLUTION INTRAMUSCULAR; INTRAVENOUS at 10:07

## 2022-04-14 NOTE — INTERVAL H&P NOTE
H&P reviewed. The patient was examined and there are no changes to the H&P, inclusive of the physical exam heart, lungs and procedure site specific exam as noted on the current (within 30 days) history and physical full detailed report.    Vitals:    04/14/22 0842   BP: 107/62   Pulse: 66   Resp: 17   Temp: 97.5 °F (36.4 °C)   SpO2: 97%     Иван Mistry MD  4/14/2022  09:56 EDT

## 2022-04-14 NOTE — DISCHARGE INSTRUCTIONS
No pushing, pulling, tugging,  heavy lifting, or strenuous activity.  No major decision making, driving, or drinking alcoholic beverages for 24 hours. ( due to the medications you have  received)  Always use good hand hygiene/washing techniques.  NO driving while taking pain medications.    * if you have an incision:  Check your incision area every day for signs of infection.   Check for:  * more redness, swelling, or pain  *more fluid or blood  *warmth  *pus or bad smell

## 2022-04-14 NOTE — OP NOTE
[address],  [address]  [place]  [address]  [phone]      OPERATIVE REPORT      PATIENT NAME:  Teodoro Nina                            YOB: 1995       PREOP DIAGNOSIS:   Left knee patellofemoral syndrome, patella tilt and maltracking injury and lateral meniscus tear.    POSTOP DIAGNOSIS:   Same.    PROCEDURE:    Left  knee diagnostic arthroscopy, partial lateral menisectomy and  patella lateral retinacular release.    SURGEON:     Jewel Mistry MD    OPERATIVE TEAM:  Circulator: Brody Johansen RN; Enedina Beebe RN  Scrub Person: Margy Lynne PCT; Liss Canseco PCT    ANESTHETIST:  CRNA: Trae Jimenez CRNA    ANESTHESIA:  General plus local.    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    SPECIMENS:    None.    IMPLANTS:     None.    DRAINS:     None.    COMPLICATIONS:    None.    DISPOSITION:    Stable to recovery.    INDICATIONS:     Persistent pain, swelling, mechanical popping,clicking, lateral patella tracking and extensor mechanism dysfunction of the knee with clinical exam and imaging showing patellofemoral lateral tilt and compression as well as an anterior horn lateral meniscus tear on MRI imaging.    NARRATIVE:    The patient presents with a continued knee painful limiting condition.  Treatment efforts and extended physical therapy have provided limited relief.  The option of elective knee arthroscopy evaluation and treatment discussed and the patient would like to proceed.  Risks, benefits and alternatives discussed and informed consent for surgery obtained. Risks discussed including but not limited to anesthesia, infection and persistent or progressive knee joint symptoms.  Goals include the potential for pain relief, decreased swelling, improved mobility and function with the knee condition.  The  patient presents for elective knee diagnostic arthroscopic evaluation and treatment.    Antibiotic prophylaxis was given.  Anesthesia was effective and well tolerated.  The knee and leg was prepped and draped in the usual sterile fashion.  A tourniquet was not used and there was good hemostasis throughout the procedure.  At the start of the procedure one percent lidocaine with epinephrine was injected at the portal sites and at the end of the procedure an injection of ropivicaine was given for post-operative pain control.  After sterile prep and drape an arthroscopy of the knee was performed.    Anteromedial and anterolateral portals were made.  The patellofemoral compartment showed lateral tilt and mild lateral tracking through the range of motion. Minimal chondral wear of the patella compartment.  No prominent plica band.  In the intercondylar notch, the ACL and PCL were probed, intact and stable.  In the medial compartment, the surface cartilage appeared normal stable with intact medial meniscus.  The lateral compartment showed extensive displaced frayed and fragmented meniscal tears mostly of the anterior horn though also including the mid-lateral and posterior horn.  It is possible the patient had a partially discoid lateral meniscus with tearing of the center portion. There was mild grade 3 chondral fibrillation of the lateral compartment.  The popliteus tendon and the hiatus portion of the posterior horn of the lateral mensicus was intact.  Using a full radius shaver, and various biters including specialty curved, side and backward biters, a partial lateral menisectomy and lateral chondroplasty was performed.  The arthroscopic camera was placed in the anteromedial portal and working instruments, biters and shaver in the anterolateral portal to facilitate the removal of the torn fragments of the anterior horn of the lateral meniscus.  With the camera in the anteromedial portal and the arthroscopic cautery  probe in the anterolateral portal, a patella lateral retinacular release was performed.  After release, there was more space along the lateral facet and central patella tracking throughout the arc of knee motion.    Representative arthroscopic photos were saved.  The knee joint was irrigated and suctioned clear.  The portal sites were closed with staples and a sterile Aquacel dressing was applied.  Anesthesia was effective and well tolerated.  There were no complications of the procedure.  The patient was transferred in stable condition to recovery.

## 2022-04-14 NOTE — ANESTHESIA PROCEDURE NOTES
Airway  Urgency: elective    Date/Time: 4/14/2022 10:03 AM  Airway not difficult    General Information and Staff    Patient location during procedure: OR  CRNA: Trae Jimenez CRNA    Indications and Patient Condition  Indications for airway management: airway protection    Preoxygenated: yes  Mask difficulty assessment: 1 - vent by mask    Final Airway Details  Final airway type: supraglottic airway      Successful airway: unique  Size 5    Number of attempts at approach: 1  Assessment: lips, teeth, and gum same as pre-op and atraumatic intubation

## 2022-04-14 NOTE — ANESTHESIA PREPROCEDURE EVALUATION
Anesthesia Evaluation     Patient summary reviewed and Nursing notes reviewed   no history of anesthetic complications:  NPO Solid Status: > 8 hours  NPO Liquid Status: > 8 hours           Airway   Mallampati: I  TM distance: >3 FB  Neck ROM: full  No difficulty expected  Dental - normal exam     Pulmonary - normal exam    breath sounds clear to auscultation  (+) a smoker Former,   Cardiovascular - negative cardio ROS and normal exam    Rhythm: regular  Rate: normal        Neuro/Psych  (+) psychiatric history Anxiety and Depression,    GI/Hepatic/Renal/Endo - negative ROS     Musculoskeletal (-) negative ROS    Abdominal    Substance History - negative use     OB/GYN negative ob/gyn ROS         Other - negative ROS                     Anesthesia Plan    ASA 2     general   (Risks and benefits discussed including risk of aspiration, recall and dental damage. All patient questions answered.    Patient told that either a breathing mask or a breathing tube will be used to manage the airway.    Will continue with plan of care.)  intravenous induction     Anesthetic plan, all risks, benefits, and alternatives have been provided, discussed and informed consent has been obtained with: patient.        CODE STATUS:

## 2022-04-14 NOTE — ANESTHESIA POSTPROCEDURE EVALUATION
Patient: Teodoro Nina    Procedure Summary     Date: 04/14/22 Room / Location: [facility] OR  /  [facility] OR    Anesthesia Start: 0958 Anesthesia Stop: 1133    Procedure: Left knee diagnostic arthroscopy, partial lateral meniscectomy, lateral chondroplasty and patella lateral retinacular release. (Left Knee) Diagnosis:       Patella, chondromalacia, left      Tear of lateral meniscus of left knee, current, unspecified tear type, initial encounter      Patellofemoral dysfunction, left      (Patella, chondromalacia, left [M22.42])      (Tear of lateral meniscus of left knee, current, unspecified tear type, initial encounter [S83.282A])      (Patellofemoral dysfunction, left [M25.862])    Surgeons: Иван Mistry MD Provider: Trae Jimenez CRNA    Anesthesia Type: general ASA Status: 2          Anesthesia Type: general    Vitals  Vitals Value Taken Time   /65 04/14/22 1238   Temp 97.6 °F (36.4 °C) 04/14/22 1235   Pulse 76 04/14/22 1239   Resp 13 04/14/22 1235   SpO2 99 % 04/14/22 1239   Vitals shown include unvalidated device data.        Post Anesthesia Care and Evaluation    Patient location during evaluation: PHASE II  Patient participation: complete - patient participated  Level of consciousness: awake  Pain score: 2  Pain management: adequate  Airway patency: patent  Anesthetic complications: No anesthetic complications  PONV Status: none  Cardiovascular status: acceptable  Respiratory status: acceptable  Hydration status: acceptable  No anesthesia care post op

## 2022-04-28 ENCOUNTER — OFFICE VISIT (OUTPATIENT)
Dept: ORTHOPEDIC SURGERY | Facility: CLINIC | Age: 27
End: 2022-04-28

## 2022-04-28 VITALS — TEMPERATURE: 97.6 F | WEIGHT: 220 LBS | HEIGHT: 72 IN | BODY MASS INDEX: 29.8 KG/M2

## 2022-04-28 DIAGNOSIS — Z98.890 S/P ARTHROSCOPIC KNEE SURGERY: Primary | ICD-10-CM

## 2022-04-28 PROCEDURE — 99024 POSTOP FOLLOW-UP VISIT: CPT | Performed by: PHYSICIAN ASSISTANT

## 2022-04-28 NOTE — PROGRESS NOTES
Subjective   Patient ID: Teodoro Nina is a 27 y.o. right hand dominant male is here today for a post-operative visit.  Post-op of the Left Knee (S/P diagnostic arthroscopy, partial lateral meniscectomy, lateral chondroplasty and patella lateral retinacular release 4/14/2022. States it is doing good, has some pain with certain movements. States he stopped taking the pain medicine because he believes it made him pass out the day after surgery.)          CHIEF COMPLAINT:  Patient presents for initial postop status post left knee diagnostic arthroscopy, partial lateral meniscectomy, lateral retinacular release.  Date of surgery 4/14/2022.  Patient denies chest pain or shortness of breath.  History of Present Illness      Pain controlled: [] no   [x] yes   Medication refill requested: [x] no   [] yes    Patient compliant with instructions: [] no   [x] yes   Other: Reports good progress since surgery.     Past Medical History:   Diagnosis Date   • Anxiety    • MRSA (methicillin resistant staph aureus) culture positive 2008    right leg txt'd.   • Seasonal allergies         Past Surgical History:   Procedure Laterality Date   • ADENOIDECTOMY     • CHOLECYSTECTOMY     • KNEE ARTHROSCOPY Left 4/14/2022    Procedure: Left knee diagnostic arthroscopy, partial lateral meniscectomy, lateral chondroplasty and patella lateral retinacular release.;  Surgeon: Иван Mistry MD;  Location: [facility];  Service: Orthopedics;  Laterality: Left;   • TONSILLECTOMY         No Known Allergies    Review of Systems   Constitutional: Negative for fever.   HENT: Negative for dental problem and voice change.    Eyes: Negative for visual disturbance.   Respiratory: Negative for shortness of breath.    Cardiovascular: Negative for chest pain.   Gastrointestinal: Negative for abdominal pain.   Genitourinary: Negative for dysuria.   Musculoskeletal: Positive for arthralgias (left knee) and joint swelling (left knee). Negative for gait problem.  "  Skin: Negative for rash.   Neurological: Negative for speech difficulty.   Hematological: Does not bruise/bleed easily.   Psychiatric/Behavioral: Negative for confusion.     I have reviewed the medical and surgical history, family history, social history, medications, and/or allergies, and the review of systems of this report.    Objective   Temp 97.6 °F (36.4 °C)   Ht 182.9 cm (72.01\")   Wt 99.8 kg (220 lb)   BMI 29.83 kg/m²       Signs of infection: [x] no                    [] yes   Drainage: [x] no                    [] yes   Incision: [x] healing well     []healed well   Motor exam intact: [] no                    [x] yes   Neurovascular exam intact: [] no                    [x] yes   Signs of compartment syndrome: [x] no                    [] yes   Signs of DVT: [x] no                    [] yes   Other:      Physical Exam  Ortho Exam    Extremity DVT signs are negative on physical exam with negative Paulo sign, no calf pain, no palpable cords and no skin tone change  Neurologic Exam    Assessment/Plan     Independent Review of Radiographic Studies:    No new imaging done today.    Laboratory and Other Studies:  No new results reviewed today.     Medical Decision Making:    Stable neurovascular exam.     Procedures     Diagnoses and all orders for this visit:    1. S/P arthroscopic knee surgery (Primary)  -     Ambulatory Referral to Physical Therapy Evaluate and treat, Ortho, POST OP; Heat; Strengthening, Stretching, ROM         Recommendations/Plan:     Sutures Staples or Pins [x] Removed today  [] At prior visit  [] Plan removal later   Physical therapy: []rehab facility  [x]outpatient referral  [] therapy ongoing   Ultrasound: [x]not ordered         []order given to patient   Labs: [x]not ordered         []order given to patient   Weight Bearing status: []Full []WBAT [x]PWB []NWB []Other     Discussion of orthopaedic goals and activities and patient and/or guardian expressed appreciation.  Regular " exercise as tolerated  Guided on proper techniques for mobility, strength, agility and/or conditioning exercises  Weight bearing parameters reviewed  Take prescribed medications as instructed only as tolerated  Follow-up in 8 weeks or as needed  Patient is encouraged and agreeable to call or return sooner for any issues or concerns.

## 2022-05-03 ENCOUNTER — TREATMENT (OUTPATIENT)
Dept: PHYSICAL THERAPY | Facility: CLINIC | Age: 27
End: 2022-05-03

## 2022-05-03 DIAGNOSIS — Z98.890 S/P LATERAL MENISCECTOMY OF LEFT KNEE: Primary | ICD-10-CM

## 2022-05-03 PROCEDURE — 97110 THERAPEUTIC EXERCISES: CPT | Performed by: PHYSICAL THERAPIST

## 2022-05-03 PROCEDURE — 97530 THERAPEUTIC ACTIVITIES: CPT | Performed by: PHYSICAL THERAPIST

## 2022-05-03 PROCEDURE — 97140 MANUAL THERAPY 1/> REGIONS: CPT | Performed by: PHYSICAL THERAPIST

## 2022-05-03 PROCEDURE — 97161 PT EVAL LOW COMPLEX 20 MIN: CPT | Performed by: PHYSICAL THERAPIST

## 2022-05-03 NOTE — PROGRESS NOTES
Physical Therapy Initial Evaluation and Plan of Care      Patient: Teodoro Nina   : 1995  Diagnosis/ICD-10 Code:  S/P lateral meniscectomy of left knee [Z98.890]  Referring practitioner: SHAWNEE Rabago*    Subjective Evaluation    History of Present Illness  Date of surgery: 2022  Mechanism of injury: Patient reports that he is doing well since surgery. He states that his pain is not terrible but the L knee is very stiff. He reports that prior to surgery he kept having random pain in the knee which is why he wanted to have the scope done. He has been off his crutches for around a week and a half. He returns to work tomorrow with restrictions of standing 10 minutes per hour. He states that the swelling in his knee is coming and going but it does seem to be improving.       Patient Occupation: [employer]  Current pain ratin  At best pain ratin  At worst pain ratin  Quality: dull ache and sharp (Sharp pains if he walks too much on it. Dull ache other times if he leaves it bent too long)  Relieving factors: ice, rest and medications (Takes ibuprofen as needed )  Aggravating factors: ambulation, squatting, lifting, stairs, standing, sleeping, repetitive movement and movement  Progression: improved    Social Support  Lives in: one-story house  Lives with: Grandparents     Treatments  Previous treatment: physical therapy and injection treatment  Patient Goals  Patient goals for therapy: decreased pain, increased motion, increased strength, independence with ADLs/IADLs, return to sport/leisure activities and decreased edema             Objective          Observations   Left Knee   Positive for incision (Healing well. No signs/symptoms of infection at this time).     Additional Knee Observation Details  Mild abrasion noted on lateral aspect of L knee. Patient reports this was from where he kept the knee wrapped. No signs/symptopms of infection present.     Palpation     Additional  Palpation Details  No tenderness noted in musculature.     Tenderness   Left Knee   Tenderness in the inferior patella.     Neurological Testing     Sensation     Knee   Left Knee   Intact: light touch    Right Knee   Intact: light touch     Additional Neurological Details  Patient with slight numbness reported on lateral aspect of L knee.     Active Range of Motion   Left Knee   Flexion: 100 degrees with pain  Extension: Left knee active extension: 0-5-100. with pain    Right Knee   Flexion: 135 degrees   Extension: 0 degrees     Patellar Mobility   Left Knee Patellar tendons within functional limits include the medial, lateral, superior and inferior.     Right Knee Patellar tendons within functional limits include the medial, lateral, superior and inferior.     Strength/Myotome Testing     Left Hip   Planes of Motion   Flexion: 4-  Extension: 4-  Abduction: 3+  Adduction: 4-    Right Hip   Planes of Motion   Flexion: 4  Extension: 4  Abduction: 4  Adduction: 4    Left Knee   Flexion: 4-  Extension: 4-  Quadriceps contraction: fair    Right Knee   Flexion: 4+  Extension: 4+  Quadriceps contraction: good    Swelling     Left Knee Girth Measurement (cm)   Joint line: 43.5cm.  10 cm above joint line: 59 cm  10 cm below joint line: 38.5 cm.    Right Knee Girth Measurement (cm)   Joint line: 41 cm  10 cm above joint line: 58 cm  10 cm below joint line: 39 cm     General Comments     Knee Comments  Patient ambulates with a shortened stride length and reduced stance time on L LE.     HEP established. Patient encouraged to ice L knee at home.    Diagnosis/prognosis reviewed.     All patient questions answered prior to completion of session.          Assessment & Plan     Assessment  Impairments: abnormal gait, abnormal or restricted ROM, activity intolerance, impaired physical strength, lacks appropriate home exercise program, pain with function and weight-bearing intolerance  Functional Limitations: lifting, sleeping,  walking, pulling, pushing, uncomfortable because of pain, standing, stooping and unable to perform repetitive tasks  Assessment details: Patient is a 27 year old male who comes to physical therapy s/p partial lateral menisectomy of L knee. Signs and symptoms are consistent following surgery.  The patient currently has pain, decreased ROM, decreased strength, and inability to perform many essential functional activities. Patient given HEP to assist with above listed deficits. Pt will benefit from skilled PT services to address the above issues.     Prognosis: good    Goals  Plan Goals: SHORT TERM GOALS:  2 weeks       1. Pt independent with HEP  2. Pt to demonstrate davis hip strength 4/5 or greater to improve stability with ambulation  3. Pt to report being able to walk for 10 minutes without increasing pain in the left knee    LONG TERM GOALS:   6 weeks  1. Pt to demonstrate ability to perform full functional squat with good form and control of the knees and without increasing pain  2. Pt to demonstrate davis hip strength to 4+/5 or greater to improve safety with ambulation on uneven surfaces  3. Pt to return to work full duty without increased pain in the left knee   4. Pt to demonstrate ability to perform step up/down 8 inch step x10 safely and without pain in the left knee         Plan  Therapy options: will be seen for skilled therapy services  Planned modality interventions: cryotherapy, electrical stimulation/[place]i stimulation and ultrasound  Planned therapy interventions: abdominal trunk stabilization, balance/weight-bearing training, flexibility, functional ROM exercises, gait training, home exercise program, joint mobilization, therapeutic activities, stretching, strengthening, spinal/joint mobilization, soft tissue mobilization, manual therapy and neuromuscular re-education  Frequency: 2x week  Duration in weeks: 6  Treatment plan discussed with: patient        Manual Therapy:    10     mins   90489;  Therapeutic Exercise:    15     mins  70717;     Neuromuscular Aung:        mins  10101;    Therapeutic Activity:     12     mins  32376;     Gait Training:           mins  37746;     Ultrasound:          mins  72069;    Electrical Stimulation:         mins  63972 ( );  Dry Needling          mins self-pay    Timed Treatment:   37   mins   Total Treatment:     58   mins    PT SIGNATURE: MAXX Sanders License: [id number]  DATE TREATMENT INITIATED: 5/3/2022    Initial Certification  Certification Period: 7/31/2022  I certify that the therapy services are furnished while this patient is under my care.  The services outlined above are required by this patient, and will be reviewed every 90 days.     PHYSICIAN: Maykel Dye PA-C      DATE:     Please sign and return via fax to [phone].. Thank you, [facility].

## 2022-05-05 ENCOUNTER — TREATMENT (OUTPATIENT)
Dept: PHYSICAL THERAPY | Facility: CLINIC | Age: 27
End: 2022-05-05

## 2022-05-05 DIAGNOSIS — Z98.890 S/P LATERAL MENISCECTOMY OF LEFT KNEE: Primary | ICD-10-CM

## 2022-05-05 PROCEDURE — 97530 THERAPEUTIC ACTIVITIES: CPT | Performed by: PHYSICAL THERAPIST

## 2022-05-05 PROCEDURE — 97110 THERAPEUTIC EXERCISES: CPT | Performed by: PHYSICAL THERAPIST

## 2022-05-05 PROCEDURE — 97140 MANUAL THERAPY 1/> REGIONS: CPT | Performed by: PHYSICAL THERAPIST

## 2022-05-05 NOTE — PROGRESS NOTES
Physical Therapy Daily Progress Note    Patient Information  Teodoro Nina  1995      Visit # : 2    Teodoro Nina reports 3/10 pain today at rest.  Patient reports that he went to work the last few days and has done fine. He states that he has not noticed much swelling or increased pain.        Objective Pt presents to PT today with no distress noted.     Left Knee   Flexion: 120   Extension: 0    Patient progressed today.     Mild tenderness noted along medial and lateral joint line of L knee.       See Exercise, Manual, and Modality Logs for complete treatment.     Assessment/Plan  Patient tolerated session well with no increased pain with exercises. Patient was progressed with no issues noted. No antalgic gait pattern noted while in clinic today. He tolerated manual therapy well with no increased pain. Patient encouraged to continue HEP and utilize ice as needed at home.       Progress per Plan of Care  PT will continue to monitor patients signs and symptoms and progress patient as tolerated.     Visit Diagnoses:    ICD-10-CM ICD-9-CM   1. S/P lateral meniscectomy of left knee  Z98.890 V45.89            Manual Therapy:    10     mins  19662;  Therapeutic Exercise:    15     mins  02408;     Neuromuscular Aung:        mins  90356;    Therapeutic Activity:     12     mins  29016;     Gait Training:           mins  27348;     Ultrasound:          mins  33205;    Electrical Stimulation:         mins  47868 ( );  Dry Needling          mins self-pay    Timed Treatment:   37   mins   Total Treatment:     50   mins          Reba Salas PT  Physical Therapist

## 2022-05-10 ENCOUNTER — TREATMENT (OUTPATIENT)
Dept: PHYSICAL THERAPY | Facility: CLINIC | Age: 27
End: 2022-05-10

## 2022-05-10 DIAGNOSIS — Z98.890 S/P LATERAL MENISCECTOMY OF LEFT KNEE: Primary | ICD-10-CM

## 2022-05-10 PROCEDURE — 97140 MANUAL THERAPY 1/> REGIONS: CPT | Performed by: PHYSICAL THERAPIST

## 2022-05-10 PROCEDURE — 97530 THERAPEUTIC ACTIVITIES: CPT | Performed by: PHYSICAL THERAPIST

## 2022-05-10 PROCEDURE — 97110 THERAPEUTIC EXERCISES: CPT | Performed by: PHYSICAL THERAPIST

## 2022-05-10 NOTE — PROGRESS NOTES
Physical Therapy Daily Progress Note    Patient Information  Teodoro Nina  1995      Visit # : 3    Teodoro Nina reports 0/10 pain today at rest.  Patient reports that he went on a 3 mile hike yesterday and the knee did well. He states that he had a little soreness in the knee when he woke up but it has went away as the day progressed. He states that the knee did swell a little last night.         Objective Pt presents to PT today with no distress noted.     L Knee PROM  Flexion: 125   Extension: -3-0-125    Very mild swelling noted in L knee today.     See Exercise, Manual, and Modality Logs for complete treatment.     Assessment/Plan  Patient tolerated session well with no increased pain throughout session. Patient is ambulating well with no antalgic gait pattern noted. Patient tolerated manual therapy well and is demonstrating improved L knee PROM. Patient encouraged to continue HEP and utilize ice as needed.       Progress per Plan of Care  PT will continue to monitor patients signs and symptoms and progress patient as tolerated.     Visit Diagnoses:    ICD-10-CM ICD-9-CM   1. S/P lateral meniscectomy of left knee  Z98.890 V45.89            Manual Therapy:    11     mins  78051;  Therapeutic Exercise:    16     mins  23774;     Neuromuscular Aung:        mins  57631;    Therapeutic Activity:     10     mins  62270;     Gait Training:           mins  48478;     Ultrasound:          mins  73103;    Electrical Stimulation:         mins  18618 ( );  Dry Needling          mins self-pay    Timed Treatment:   37   mins   Total Treatment:     50   mins          Reba Salas, PT  Physical Therapist

## 2022-05-12 ENCOUNTER — TREATMENT (OUTPATIENT)
Dept: PHYSICAL THERAPY | Facility: CLINIC | Age: 27
End: 2022-05-12

## 2022-05-12 DIAGNOSIS — Z98.890 S/P LATERAL MENISCECTOMY OF LEFT KNEE: Primary | ICD-10-CM

## 2022-05-12 PROCEDURE — 97140 MANUAL THERAPY 1/> REGIONS: CPT | Performed by: PHYSICAL THERAPIST

## 2022-05-12 PROCEDURE — 97530 THERAPEUTIC ACTIVITIES: CPT | Performed by: PHYSICAL THERAPIST

## 2022-05-12 PROCEDURE — 97110 THERAPEUTIC EXERCISES: CPT | Performed by: PHYSICAL THERAPIST

## 2022-05-19 ENCOUNTER — TREATMENT (OUTPATIENT)
Dept: PHYSICAL THERAPY | Facility: CLINIC | Age: 27
End: 2022-05-19

## 2022-05-19 DIAGNOSIS — Z98.890 S/P LATERAL MENISCECTOMY OF LEFT KNEE: Primary | ICD-10-CM

## 2022-05-19 PROCEDURE — 97140 MANUAL THERAPY 1/> REGIONS: CPT | Performed by: PHYSICAL THERAPIST

## 2022-05-19 PROCEDURE — 97530 THERAPEUTIC ACTIVITIES: CPT | Performed by: PHYSICAL THERAPIST

## 2022-05-19 PROCEDURE — 97110 THERAPEUTIC EXERCISES: CPT | Performed by: PHYSICAL THERAPIST

## 2022-05-19 NOTE — PROGRESS NOTES
"Patient vital signs are at baseline: Yes  Patient able to ambulate as they were prior to admission or with assist devices provided by therapies during their stay:  No,  Reason: sleeping most of this shift  Patient MUST void prior to discharge:  Yes  Patient able to tolerate oral intake:  Yes  Pain has adequate pain control using Oral analgesics:  Yes     VSS: elevated Bp's. Agitated and anxious at times,more alert this morning. Stated \"I am  retired , ha been a  more than 30 years.\" Sitter at bedside. CIWA protocol. Didn't require any Ativan. Scheduled Seroquel given. Tylenol Ice packs for pain. Dressing: CDI. Bladder incontinence. Plans to discharge to TCU.   " Physical Therapy Daily Progress Note    Patient Information  Teodoro Nina  1995      Visit # : 5    Teodoro Nina reports 3/10 pain today at rest.  Patient reports that he slightly bent his knee backwards yesterday. He states that it hurt the knee some and he has had some discomfort because of it.         Objective Pt presents to PT today with no distress noted.     L Knee PROM  Flexion: 127   Extension: -4-0-127      See Exercise, Manual, and Modality Logs for complete treatment.     Assessment/Plan  Patient tolerated session well with no increased pain with exercises. Patient with no tenderness to palpation in L knee. He is ambulating well with no antalgic gait pattern noted. Patient was lightly progressed with no issues noted. He tolerated manual therapy well with no discomfort reported. Patient encouraged to continue HEP and utilize ice as needed at home.       Progress per Plan of Care  PT will continue to monitor patients signs and symptoms and progress patient as tolerated.     Visit Diagnoses:    ICD-10-CM ICD-9-CM   1. S/P lateral meniscectomy of left knee  Z98.890 V45.89            Manual Therapy:    11     mins  02159;  Therapeutic Exercise:    18     mins  94263;     Neuromuscular Aung:        mins  65188;    Therapeutic Activity:     10     mins  68168;     Gait Training:           mins  38798;     Ultrasound:          mins  20847;    Electrical Stimulation:         mins  68189 ( );  Dry Needling          mins self-pay    Timed Treatment:   39   mins   Total Treatment:     52   mins          Reba Salas PT  Physical Therapist

## 2022-05-26 ENCOUNTER — TREATMENT (OUTPATIENT)
Dept: PHYSICAL THERAPY | Facility: CLINIC | Age: 27
End: 2022-05-26

## 2022-05-26 DIAGNOSIS — Z98.890 S/P LATERAL MENISCECTOMY OF LEFT KNEE: Primary | ICD-10-CM

## 2022-05-26 PROCEDURE — 97140 MANUAL THERAPY 1/> REGIONS: CPT | Performed by: PHYSICAL THERAPIST

## 2022-05-26 PROCEDURE — 97110 THERAPEUTIC EXERCISES: CPT | Performed by: PHYSICAL THERAPIST

## 2022-05-26 PROCEDURE — 97530 THERAPEUTIC ACTIVITIES: CPT | Performed by: PHYSICAL THERAPIST

## 2022-05-26 NOTE — PROGRESS NOTES
Physical Therapy Daily Progress Note    Patient Information  Teodoro Nina  1995      Visit # : 6    Teodoro Nina reports 1/10 pain today at rest.  Patient reports that his knee hurt some yesterday but he did go to the gym and work out. He states that he has been icing the knee. He states that he was up on his feet a lot today at work and his knee gave him no issues.         Objective Pt presents to PT today with no distress noted.     L Knee PROM  Flexion: 126   Extension: -4-0-126      See Exercise, Manual, and Modality Logs for complete treatment.     Assessment/Plan  Patient tolerated session well with no increased pain throughout session. No tenderness noted in L knee. He is ambulating well with no deficits noted. He tolerated manual therapy well with no discomfort. Patient encouraged to continue HEP and utilize ice as needed at home.       Progress per Plan of Care  PT will continue to monitor patients signs and symptoms and progress patient as tolerated.     Visit Diagnoses:    ICD-10-CM ICD-9-CM   1. S/P lateral meniscectomy of left knee  Z98.890 V45.89            Manual Therapy:    10     mins  95612;  Therapeutic Exercise:    17     mins  87606;     Neuromuscular Aung:        mins  00308;    Therapeutic Activity:     11     mins  46218;     Gait Training:           mins  24237;     Ultrasound:          mins  13388;    Electrical Stimulation:         mins  43709 ( );  Dry Needling          mins self-pay    Timed Treatment:   38   mins   Total Treatment:     50   mins          Reba Salas, PT  Physical Therapist

## 2022-06-01 ENCOUNTER — TREATMENT (OUTPATIENT)
Dept: PHYSICAL THERAPY | Facility: CLINIC | Age: 27
End: 2022-06-01

## 2022-06-01 DIAGNOSIS — Z98.890 S/P LATERAL MENISCECTOMY OF LEFT KNEE: Primary | ICD-10-CM

## 2022-06-01 PROCEDURE — 97530 THERAPEUTIC ACTIVITIES: CPT | Performed by: PHYSICAL THERAPIST

## 2022-06-01 PROCEDURE — 97140 MANUAL THERAPY 1/> REGIONS: CPT | Performed by: PHYSICAL THERAPIST

## 2022-06-01 PROCEDURE — 97110 THERAPEUTIC EXERCISES: CPT | Performed by: PHYSICAL THERAPIST

## 2022-06-01 NOTE — PROGRESS NOTES
Physical Therapy Daily Progress Note    Patient Information  Teodoro Nina  1995      Visit # : 7    Teodoro Nina reports 0/10 pain today at rest.  Patient reports that his knee is doing well. He has not had any pain and has been able to hike with no issues. He states that he continues to have a little stiffness in the knee but not too much.         Objective Pt presents to PT today with no distress noted.     L Knee PROM  Flexion: 132   Extension: -4-0-132      See Exercise, Manual, and Modality Logs for complete treatment.     Assessment/Plan  Patient tolerated session well with no pain present throughout session. No tenderness noted in L knee. He is ambulating well with no issues noted. Patient tolerated manual therapy well with no discomfort reported. He is demonstrating improved PROM of L knee. Patient encouraged to continue HEP and utilize ice as needed at home.       Progress per Plan of Care  PT will continue to monitor patients signs and symptoms and progress patient as tolerated.     Visit Diagnoses:    ICD-10-CM ICD-9-CM   1. S/P lateral meniscectomy of left knee  Z98.890 V45.89            Manual Therapy:    10     mins  40142;  Therapeutic Exercise:    18     mins  52494;     Neuromuscular Aung:        mins  43542;    Therapeutic Activity:     12     mins  97656;     Gait Training:           mins  18418;     Ultrasound:          mins  21503;    Electrical Stimulation:         mins  63730 ( );  Dry Needling          mins self-pay    Timed Treatment:   40   mins   Total Treatment:     50   mins          Reba Salas, PT  Physical Therapist

## 2022-06-03 ENCOUNTER — TREATMENT (OUTPATIENT)
Dept: PHYSICAL THERAPY | Facility: CLINIC | Age: 27
End: 2022-06-03

## 2022-06-03 DIAGNOSIS — Z98.890 S/P LATERAL MENISCECTOMY OF LEFT KNEE: Primary | ICD-10-CM

## 2022-06-03 PROCEDURE — 97140 MANUAL THERAPY 1/> REGIONS: CPT | Performed by: PHYSICAL THERAPIST

## 2022-06-03 PROCEDURE — 97530 THERAPEUTIC ACTIVITIES: CPT | Performed by: PHYSICAL THERAPIST

## 2022-06-03 PROCEDURE — 97110 THERAPEUTIC EXERCISES: CPT | Performed by: PHYSICAL THERAPIST

## 2022-06-07 ENCOUNTER — TREATMENT (OUTPATIENT)
Dept: PHYSICAL THERAPY | Facility: CLINIC | Age: 27
End: 2022-06-07

## 2022-06-07 DIAGNOSIS — Z98.890 S/P LATERAL MENISCECTOMY OF LEFT KNEE: Primary | ICD-10-CM

## 2022-06-07 PROCEDURE — 97530 THERAPEUTIC ACTIVITIES: CPT | Performed by: PHYSICAL THERAPIST

## 2022-06-07 PROCEDURE — 97110 THERAPEUTIC EXERCISES: CPT | Performed by: PHYSICAL THERAPIST

## 2022-06-07 PROCEDURE — 97140 MANUAL THERAPY 1/> REGIONS: CPT | Performed by: PHYSICAL THERAPIST

## 2022-06-07 NOTE — PROGRESS NOTES
Physical Therapy Daily Progress Note    Patient Information  Teodoro Nina  1995      Visit # : 9    Teodoro Nina reports 0/10 pain today at rest.  Patient reports the knee is doing better. He has soreness at times but it is manageable. He states that he has not been hiking in a little while. He states the knee is not as stiff as it was before.         Objective Pt presents to PT today with no distress noted.     L Knee PROM  Flexion: 130   Extension: -4-0-130    Patient was progressed today with no issues noted.      See Exercise, Manual, and Modality Logs for complete treatment.     Assessment/Plan  Patient tolerated session well with no pain present throughout session. Mild lateral joint line tenderness of L knee is noted today. He is ambulating well with no issues noted. He tolerated manual therapy well and is demonstrating good ROM in L knee. Patient encouraged to continue HEP and utilize ice as needed at home.       Progress per Plan of Care  PT will continue to monitor patients signs and symptoms and progress patient as tolerated.     Visit Diagnoses:    ICD-10-CM ICD-9-CM   1. S/P lateral meniscectomy of left knee  Z98.890 V45.89            Manual Therapy:    12     mins  42723;  Therapeutic Exercise:    15     mins  58646;     Neuromuscular Aung:        mins  25871;    Therapeutic Activity:     12     mins  82519;     Gait Training:           mins  58661;     Ultrasound:          mins  73349;    Electrical Stimulation:         mins  20344 ( );  Dry Needling          mins self-pay    Timed Treatment:   39   mins   Total Treatment:     50   mins          Reba Salas, PT  Physical Therapist

## 2022-06-09 ENCOUNTER — TREATMENT (OUTPATIENT)
Dept: PHYSICAL THERAPY | Facility: CLINIC | Age: 27
End: 2022-06-09

## 2022-06-09 DIAGNOSIS — Z98.890 S/P LATERAL MENISCECTOMY OF LEFT KNEE: Primary | ICD-10-CM

## 2022-06-09 PROCEDURE — 97140 MANUAL THERAPY 1/> REGIONS: CPT | Performed by: PHYSICAL THERAPIST

## 2022-06-09 PROCEDURE — 97530 THERAPEUTIC ACTIVITIES: CPT | Performed by: PHYSICAL THERAPIST

## 2022-06-09 PROCEDURE — 97110 THERAPEUTIC EXERCISES: CPT | Performed by: PHYSICAL THERAPIST

## 2022-06-09 NOTE — PROGRESS NOTES
Physical Therapy Daily Progress Note    Patient Information  Teodoro Nina  1995      Visit # : 10    Teodoro Nina reports 0/10 pain today at rest.  Patient reports that his knee is doing well. He has not had any flare ups in the last week or so. He states that his ROM seems to almost be back to normal.         Objective Pt presents to PT today with no distress noted.     L Knee PROM  Flexion: 133   Extension: -4-0-133      See Exercise, Manual, and Modality Logs for complete treatment.     Assessment/Plan  Patient tolerated session well with no increased pain throughout session. Patient is ambulating well with no deficit noted. Very mild tenderness is noted in L lateral joint line. He tolerated manual therapy well and is demonstrating improved L knee PROM. Patient encouraged to continue HEP and utilize ice as needed at home.     Decreasing plan to one day per week.       Progress per Plan of Care  PT will continue to monitor patients signs and symptoms and progress patient as tolerated.     Visit Diagnoses:    ICD-10-CM ICD-9-CM   1. S/P lateral meniscectomy of left knee  Z98.890 V45.89            Manual Therapy:    10     mins  12140;  Therapeutic Exercise:    15     mins  53378;     Neuromuscular Aung:        mins  06070;    Therapeutic Activity:     11     mins  85463;     Gait Training:           mins  01864;     Ultrasound:          mins  59968;    Electrical Stimulation:         mins  00848 ( );  Dry Needling          mins self-pay    Timed Treatment:   36   mins   Total Treatment:     48   mins          Reba Salas, PT  Physical Therapist

## 2022-06-16 ENCOUNTER — TREATMENT (OUTPATIENT)
Dept: PHYSICAL THERAPY | Facility: CLINIC | Age: 27
End: 2022-06-16

## 2022-06-16 DIAGNOSIS — Z98.890 S/P LATERAL MENISCECTOMY OF LEFT KNEE: Primary | ICD-10-CM

## 2022-06-16 PROCEDURE — 97530 THERAPEUTIC ACTIVITIES: CPT | Performed by: PHYSICAL THERAPIST

## 2022-06-16 PROCEDURE — 97140 MANUAL THERAPY 1/> REGIONS: CPT | Performed by: PHYSICAL THERAPIST

## 2022-06-16 PROCEDURE — 97110 THERAPEUTIC EXERCISES: CPT | Performed by: PHYSICAL THERAPIST

## 2022-06-16 NOTE — PROGRESS NOTES
Physical Therapy Daily Progress Note    Patient Information  Teodoro Nina  1995      Visit # : 11    Teodoro Nina reports 0/10 pain today at rest.  Patient reports that his knee is doing well. He only has mild discomfort when he really does a lot. He states that typically the discomfort will not linger until the next day.         Objective Pt presents to PT today with no distress noted.     Patient lightly progressed today with no issues noted.     L Knee PROM  Flexion: 134   Extension: -4-0-134    See Exercise, Manual, and Modality Logs for complete treatment.     Assessment/Plan  Patient tolerated session well with no pain present throughout session. Patient was progressed today with no issues noted. Patient with no tenderness to palpation in L knee. He tolerated manual therapy well with no discomfort reported. Patient encouraged to continue HEP and utilize ice as needed at home.       Progress per Plan of Care  PT will continue to monitor patients signs and symptoms and progress patient as tolerated.     Visit Diagnoses:    ICD-10-CM ICD-9-CM   1. S/P lateral meniscectomy of left knee  Z98.890 V45.89            Manual Therapy:    10     mins  90411;  Therapeutic Exercise:    16     mins  98129;     Neuromuscular Aung:        mins  78627;    Therapeutic Activity:     12     mins  99585;     Gait Training:           mins  32787;     Ultrasound:          mins  88082;    Electrical Stimulation:         mins  85561 ( );  Dry Needling          mins self-pay    Timed Treatment:   38   mins   Total Treatment:     48   mins          Reba Salas PT  Physical Therapist

## 2022-07-13 LAB
ALBUMIN SERPL-MCNC: 4.3 G/DL (ref 3.5–5.2)
ALBUMIN/GLOB SERPL: 2 G/DL
ALP SERPL-CCNC: 54 U/L (ref 39–117)
ALT SERPL-CCNC: 14 U/L (ref 1–41)
AST SERPL-CCNC: 16 U/L (ref 1–40)
BASOPHILS # BLD AUTO: 0.02 10*3/MM3 (ref 0–0.2)
BASOPHILS NFR BLD AUTO: 0.3 % (ref 0–1.5)
BILIRUB SERPL-MCNC: 1.7 MG/DL (ref 0–1.2)
BUN SERPL-MCNC: 14 MG/DL (ref 6–20)
BUN/CREAT SERPL: 18.9 (ref 7–25)
CALCIUM SERPL-MCNC: 9.4 MG/DL (ref 8.6–10.5)
CHLORIDE SERPL-SCNC: 103 MMOL/L (ref 98–107)
CHOLEST SERPL-MCNC: 140 MG/DL (ref 0–200)
CO2 SERPL-SCNC: 28.1 MMOL/L (ref 22–29)
CREAT SERPL-MCNC: 0.74 MG/DL (ref 0.76–1.27)
EGFRCR SERPLBLD CKD-EPI 2021: 129 ML/MIN/1.73
EOSINOPHIL # BLD AUTO: 0.11 10*3/MM3 (ref 0–0.4)
EOSINOPHIL NFR BLD AUTO: 1.7 % (ref 0.3–6.2)
ERYTHROCYTE [DISTWIDTH] IN BLOOD BY AUTOMATED COUNT: 11.8 % (ref 12.3–15.4)
ESTRADIOL SERPL-MCNC: 48.1 PG/ML (ref 7.6–42.6)
GLOBULIN SER CALC-MCNC: 2.2 GM/DL
GLUCOSE SERPL-MCNC: 95 MG/DL (ref 65–99)
HCT VFR BLD AUTO: 42 % (ref 37.5–51)
HDLC SERPL-MCNC: 52 MG/DL (ref 40–60)
HGB BLD-MCNC: 14.9 G/DL (ref 13–17.7)
IMM GRANULOCYTES # BLD AUTO: 0.02 10*3/MM3 (ref 0–0.05)
IMM GRANULOCYTES NFR BLD AUTO: 0.3 % (ref 0–0.5)
LDLC SERPL CALC-MCNC: 70 MG/DL (ref 0–100)
LYMPHOCYTES # BLD AUTO: 2.55 10*3/MM3 (ref 0.7–3.1)
LYMPHOCYTES NFR BLD AUTO: 40.3 % (ref 19.6–45.3)
MCH RBC QN AUTO: 30.5 PG (ref 26.6–33)
MCHC RBC AUTO-ENTMCNC: 35.5 G/DL (ref 31.5–35.7)
MCV RBC AUTO: 86.1 FL (ref 79–97)
MONOCYTES # BLD AUTO: 0.46 10*3/MM3 (ref 0.1–0.9)
MONOCYTES NFR BLD AUTO: 7.3 % (ref 5–12)
NEUTROPHILS # BLD AUTO: 3.17 10*3/MM3 (ref 1.7–7)
NEUTROPHILS NFR BLD AUTO: 50.1 % (ref 42.7–76)
NRBC BLD AUTO-RTO: 0 /100 WBC (ref 0–0.2)
PLATELET # BLD AUTO: 199 10*3/MM3 (ref 140–450)
POTASSIUM SERPL-SCNC: 4.2 MMOL/L (ref 3.5–5.2)
PROLACTIN SERPL-MCNC: 37.3 NG/ML (ref 4–15.2)
PROT SERPL-MCNC: 6.5 G/DL (ref 6–8.5)
RBC # BLD AUTO: 4.88 10*6/MM3 (ref 4.14–5.8)
SODIUM SERPL-SCNC: 140 MMOL/L (ref 136–145)
TESTOST FREE SERPL-MCNC: 21.9 PG/ML (ref 9.3–26.5)
TESTOST SERPL-MCNC: 484 NG/DL (ref 264–916)
TRIGL SERPL-MCNC: 96 MG/DL (ref 0–150)
TSH SERPL DL<=0.005 MIU/L-ACNC: 1.62 UIU/ML (ref 0.27–4.2)
VLDLC SERPL CALC-MCNC: 18 MG/DL (ref 5–40)
WBC # BLD AUTO: 6.33 10*3/MM3 (ref 3.4–10.8)

## 2022-07-14 DIAGNOSIS — R79.89 PROLACTIN INCREASED: Primary | ICD-10-CM

## 2022-07-18 ENCOUNTER — OFFICE VISIT (OUTPATIENT)
Dept: INTERNAL MEDICINE | Facility: CLINIC | Age: 27
End: 2022-07-18

## 2022-07-18 VITALS
HEART RATE: 62 BPM | HEIGHT: 72 IN | SYSTOLIC BLOOD PRESSURE: 120 MMHG | TEMPERATURE: 97.5 F | DIASTOLIC BLOOD PRESSURE: 74 MMHG | OXYGEN SATURATION: 98 % | WEIGHT: 214 LBS | BODY MASS INDEX: 28.99 KG/M2

## 2022-07-18 DIAGNOSIS — F41.9 ANXIETY: ICD-10-CM

## 2022-07-18 DIAGNOSIS — N62 GYNECOMASTIA: ICD-10-CM

## 2022-07-18 DIAGNOSIS — E66.9 CLASS 1 OBESITY WITHOUT SERIOUS COMORBIDITY WITH BODY MASS INDEX (BMI) OF 31.0 TO 31.9 IN ADULT, UNSPECIFIED OBESITY TYPE: ICD-10-CM

## 2022-07-18 DIAGNOSIS — R79.89 PROLACTIN INCREASED: ICD-10-CM

## 2022-07-18 DIAGNOSIS — R79.89 HIGH SERUM ESTRADIOL: ICD-10-CM

## 2022-07-18 DIAGNOSIS — F32.4 MAJOR DEPRESSIVE DISORDER IN PARTIAL REMISSION, UNSPECIFIED WHETHER RECURRENT: Primary | ICD-10-CM

## 2022-07-18 PROCEDURE — 99213 OFFICE O/P EST LOW 20 MIN: CPT | Performed by: INTERNAL MEDICINE

## 2022-07-18 NOTE — PROGRESS NOTES
Subjective   Teodoro Nina is a 27 y.o. male.     Chief Complaint   Patient presents with   • Follow-up     Recent lab results   • Depression       History of Present Illness   patient here for follow-up. Depression anxiety stable without medication. Blood test showed estradiol elevated in the prolactin elevated. Patient does have gynecomastia. Weight is also elevated. Patient denies any vision disturbance denies any headache    Current Outpatient Medications:   •  ibuprofen (ADVIL,MOTRIN) 200 MG tablet, Take 200 mg by mouth Every 6 (Six) Hours As Needed for Mild Pain ., Disp: , Rfl:   •  multivitamin (THERAGRAN) tablet tablet, Take  by mouth Daily., Disp: , Rfl:     The following portions of the patient's history were reviewed and updated as appropriate: allergies, current medications, past family history, past medical history, past social history, past surgical history and problem list.    Review of Systems   Constitutional: Negative.    Respiratory: Negative.    Cardiovascular: Negative.    Gastrointestinal: Negative.    Musculoskeletal: Negative.    Skin: Negative.    Neurological: Negative.    Psychiatric/Behavioral: Negative.        Objective   Physical Exam  Cardiovascular:      Rate and Rhythm: Normal rate and regular rhythm.      Heart sounds: Normal heart sounds.   Pulmonary:      Effort: Pulmonary effort is normal.      Breath sounds: Normal breath sounds.   Abdominal:      General: Bowel sounds are normal.   Musculoskeletal:      Cervical back: Neck supple.   Skin:     General: Skin is warm.   Neurological:      Mental Status: He is alert and oriented to person, place, and time.         All tests have been reviewed.    Assessment & Plan   Diagnoses and all orders for this visit:    Major depressive disorder in partial remission, unspecified whether recurrent (HCC)    Anxiety    Class 1 obesity without serious comorbidity with body mass index (BMI) of 31.0 to 31.9 in adult, unspecified obesity  type    Prolactin increased  -     Ambulatory Referral to Endocrinology    Gynecomastia  -     Ambulatory Referral to Endocrinology    High serum estradiol  -     Ambulatory Referral to Endocrinology

## 2022-10-21 ENCOUNTER — OFFICE VISIT (OUTPATIENT)
Dept: ENDOCRINOLOGY | Facility: CLINIC | Age: 27
End: 2022-10-21

## 2022-10-21 ENCOUNTER — LAB (OUTPATIENT)
Dept: LAB | Facility: HOSPITAL | Age: 27
End: 2022-10-21

## 2022-10-21 VITALS
HEART RATE: 62 BPM | SYSTOLIC BLOOD PRESSURE: 110 MMHG | DIASTOLIC BLOOD PRESSURE: 60 MMHG | BODY MASS INDEX: 29.93 KG/M2 | OXYGEN SATURATION: 98 % | WEIGHT: 221 LBS | HEIGHT: 72 IN

## 2022-10-21 DIAGNOSIS — E22.1 HYPERPROLACTINEMIA: ICD-10-CM

## 2022-10-21 DIAGNOSIS — N62 GYNECOMASTIA: Primary | ICD-10-CM

## 2022-10-21 DIAGNOSIS — R79.89 HIGH SERUM ESTRADIOL: ICD-10-CM

## 2022-10-21 PROCEDURE — 84146 ASSAY OF PROLACTIN: CPT

## 2022-10-21 PROCEDURE — 84305 ASSAY OF SOMATOMEDIN: CPT

## 2022-10-21 PROCEDURE — 83002 ASSAY OF GONADOTROPIN (LH): CPT

## 2022-10-21 PROCEDURE — 82533 TOTAL CORTISOL: CPT

## 2022-10-21 PROCEDURE — 84443 ASSAY THYROID STIM HORMONE: CPT

## 2022-10-21 PROCEDURE — 83001 ASSAY OF GONADOTROPIN (FSH): CPT

## 2022-10-21 PROCEDURE — 82627 DEHYDROEPIANDROSTERONE: CPT

## 2022-10-21 PROCEDURE — 99204 OFFICE O/P NEW MOD 45 MIN: CPT | Performed by: INTERNAL MEDICINE

## 2022-10-21 PROCEDURE — 84439 ASSAY OF FREE THYROXINE: CPT

## 2022-10-21 NOTE — ASSESSMENT & PLAN NOTE
Recent prolactin was elevated.  I would like to check further pituitary labs.  If prolactin still high, will arrange for pituitary MRI.

## 2022-10-21 NOTE — PROGRESS NOTES
"Subjective   Teodoro Nina is a 27 y.o. male who presents to establish care for Gynecomastia    Chief complaint: high prolactin      History of Present Illness     He has noted some breast enlargement since puberty.  He has lost weight recently that was helped.  He has lost about 80 pounds.  He has started weight lifting also.  He had labs done several months ago.  The estradiol was minimally elevated.  Free testo was normal.  CMP and CBC were okay.  TSH was normal.  Prolactin was 2x normal.  He isn't taking any meds that would increase prolactin.  He denies any marijuana use.  He denies any excess EtOH use.      The following portions of the patient's history were reviewed and updated as appropriate: allergies, current medications, past family history, past medical history, past social history, past surgical history and problem list.    Review of Systems   Constitutional: Positive for fatigue.   HENT: Positive for nosebleeds.    Eyes: Negative.    Respiratory: Negative.    Cardiovascular: Negative.    Gastrointestinal: Negative.    Endocrine: Positive for polydipsia and polyuria.   Genitourinary: Negative.    Musculoskeletal: Negative.    Skin: Negative.    Allergic/Immunologic: Negative.    Neurological: Negative.    Hematological: Negative.    Psychiatric/Behavioral: Positive for dysphoric mood. The patient is nervous/anxious.        Objective   Visit Vitals  /60 (BP Location: Left arm, Patient Position: Sitting, Cuff Size: Adult)   Pulse 62   Ht 182.9 cm (72\")   Wt 100 kg (221 lb)   SpO2 98%   BMI 29.97 kg/m²      Physical Exam  Constitutional:       Appearance: Normal appearance.   HENT:      Head: Normocephalic and atraumatic.   Eyes:      Extraocular Movements: Extraocular movements intact.      Conjunctiva/sclera: Conjunctivae normal.      Pupils: Pupils are equal, round, and reactive to light.   Neck:      Thyroid: No thyroid mass, thyromegaly or thyroid tenderness.   Cardiovascular:      Rate and " Rhythm: Normal rate and regular rhythm.      Pulses: Normal pulses.      Heart sounds: Normal heart sounds.   Pulmonary:      Effort: Pulmonary effort is normal.      Breath sounds: Normal breath sounds.   Chest:      Comments: No increased breast tissue but some pendulousness noted due to fat and excess skin bilaterally  Abdominal:      General: Bowel sounds are normal.      Palpations: Abdomen is soft.   Musculoskeletal:         General: Normal range of motion.      Cervical back: Normal range of motion and neck supple.   Lymphadenopathy:      Cervical: No cervical adenopathy.   Skin:     General: Skin is warm and dry.   Neurological:      General: No focal deficit present.      Mental Status: He is alert.   Psychiatric:         Mood and Affect: Mood normal.         Behavior: Behavior normal.         Thought Content: Thought content normal.         Judgment: Judgment normal.         Assessment & Plan     Diagnoses and all orders for this visit:    1. Gynecomastia (Primary)  Assessment & Plan:  He appears to have increased fat and excess skin in the breast area but no true gynecomastia.  Recent CMP and CBC were okay.  He is euthyroid.  Mildly elevated estradiol but I don't think this is causing any issues with gynecomastia.  He is considering plastic surgery which I think is the best option for him.        2. Hyperprolactinemia (HCC)  Assessment & Plan:  Recent prolactin was elevated.  I would like to check further pituitary labs.  If prolactin still high, will arrange for pituitary MRI.    Orders:  -     TSH; Future  -     T4, Free; Future  -     Insulin-like Growth Factor; Future  -     Cortisol; Future  -     FSH & LH; Future  -     DHEA-Sulfate; Future  -     Prolactin; Future    3. High serum estradiol  Assessment & Plan:  He has mildly elevated estradiol.  This is likely due to increased aromatization of testosterone due to overweight.  Work on weight loss.                 Return in about 3 months (around  1/21/2023) for Recheck with CMP, prolactin, DHEA-S.    Glenn Rutledge MD   10/21/2022

## 2022-10-21 NOTE — ASSESSMENT & PLAN NOTE
He appears to have increased fat and excess skin in the breast area but no true gynecomastia.  Recent CMP and CBC were okay.  He is euthyroid.  Mildly elevated estradiol but I don't think this is causing any issues with gynecomastia.  He is considering plastic surgery which I think is the best option for him.

## 2022-10-21 NOTE — ASSESSMENT & PLAN NOTE
He has mildly elevated estradiol.  This is likely due to increased aromatization of testosterone due to overweight.  Work on weight loss.

## 2022-10-22 LAB
CORTIS SERPL-MCNC: 5.05 MCG/DL
DHEA-S SERPL-MCNC: 570 UG/DL (ref 138.5–475.2)
FSH SERPL-ACNC: 3.25 MIU/ML
LH SERPL-ACNC: 3.81 MIU/ML
PROLACTIN SERPL-MCNC: 11.5 NG/ML (ref 4.04–15.2)
T4 FREE SERPL-MCNC: 1.38 NG/DL (ref 0.93–1.7)
TSH SERPL DL<=0.05 MIU/L-ACNC: 0.83 UIU/ML (ref 0.27–4.2)

## 2022-10-25 LAB — IGF-I SERPL-MCNC: 317 NG/ML (ref 109–353)

## 2022-12-29 NOTE — PROGRESS NOTES
Physical Therapy Daily Progress Note    Patient Information  Teodoro Nina  1995      Visit # : 8    Teodoro Nina reports 0/10 pain today at rest.  Patient reports that his knee hurt some yesterday but he is not really sure why. He states that it does feel better today and is not really sore anymore.         Objective Pt presents to PT today with no distress noted.     L Knee PROM  Flexion: 128   Extension: -4-0-128      See Exercise, Manual, and Modality Logs for complete treatment.     Assessment/Plan  Patient tolerated session well with no increased pain throughout session. Mild tenderness to palpation noted along lateral joint line. Patient tolerated manual therapy well, slight tightness was noted in L knee. Patient encouraged to continue HEP and utilize ice as needed at home.       Progress per Plan of Care  PT will continue to monitor patients signs and symptoms and progress patient as tolerated.     Visit Diagnoses:    ICD-10-CM ICD-9-CM   1. S/P lateral meniscectomy of left knee  Z98.890 V45.89            Manual Therapy:    11     mins  84142;  Therapeutic Exercise:    16     mins  55803;     Neuromuscular Aung:        mins  69166;    Therapeutic Activity:     12     mins  59970;     Gait Training:           mins  90611;     Ultrasound:          mins  32675;    Electrical Stimulation:         mins  29015 ( );  Dry Needling          mins self-pay    Timed Treatment:   39   mins   Total Treatment:     52   mins          Reba Salas PT  Physical Therapist         Stable Surgery/Other

## 2023-03-02 ENCOUNTER — OFFICE VISIT (OUTPATIENT)
Dept: ENDOCRINOLOGY | Facility: CLINIC | Age: 28
End: 2023-03-02
Payer: COMMERCIAL

## 2023-03-02 VITALS
HEART RATE: 57 BPM | SYSTOLIC BLOOD PRESSURE: 116 MMHG | BODY MASS INDEX: 30.2 KG/M2 | DIASTOLIC BLOOD PRESSURE: 64 MMHG | WEIGHT: 223 LBS | HEIGHT: 72 IN | OXYGEN SATURATION: 98 %

## 2023-03-02 DIAGNOSIS — S83.282A TEAR OF LATERAL MENISCUS OF LEFT KNEE, CURRENT, UNSPECIFIED TEAR TYPE, INITIAL ENCOUNTER: ICD-10-CM

## 2023-03-02 DIAGNOSIS — E22.1 HYPERPROLACTINEMIA: ICD-10-CM

## 2023-03-02 DIAGNOSIS — M22.42 PATELLA, CHONDROMALACIA, LEFT: ICD-10-CM

## 2023-03-02 DIAGNOSIS — M25.862 PATELLOFEMORAL DYSFUNCTION, LEFT: ICD-10-CM

## 2023-03-02 DIAGNOSIS — R79.89 HIGH SERUM ESTRADIOL: ICD-10-CM

## 2023-03-02 DIAGNOSIS — N62 GYNECOMASTIA: Primary | ICD-10-CM

## 2023-03-02 LAB
ALBUMIN SERPL-MCNC: 4.9 G/DL (ref 3.5–5.2)
ALBUMIN/GLOB SERPL: 2 G/DL
ALP SERPL-CCNC: 61 U/L (ref 39–117)
ALT SERPL W P-5'-P-CCNC: 22 U/L (ref 1–41)
ANION GAP SERPL CALCULATED.3IONS-SCNC: 5.6 MMOL/L (ref 5–15)
AST SERPL-CCNC: 18 U/L (ref 1–40)
BILIRUB SERPL-MCNC: 1.8 MG/DL (ref 0–1.2)
BUN SERPL-MCNC: 21 MG/DL (ref 6–20)
BUN/CREAT SERPL: 27.6 (ref 7–25)
CALCIUM SPEC-SCNC: 10 MG/DL (ref 8.6–10.5)
CHLORIDE SERPL-SCNC: 100 MMOL/L (ref 98–107)
CO2 SERPL-SCNC: 30.4 MMOL/L (ref 22–29)
CREAT SERPL-MCNC: 0.76 MG/DL (ref 0.76–1.27)
EGFRCR SERPLBLD CKD-EPI 2021: 127.9 ML/MIN/1.73
GLOBULIN UR ELPH-MCNC: 2.4 GM/DL
GLUCOSE SERPL-MCNC: 82 MG/DL (ref 65–99)
POTASSIUM SERPL-SCNC: 4.3 MMOL/L (ref 3.5–5.2)
PROT SERPL-MCNC: 7.3 G/DL (ref 6–8.5)
SODIUM SERPL-SCNC: 136 MMOL/L (ref 136–145)

## 2023-03-02 PROCEDURE — 82670 ASSAY OF TOTAL ESTRADIOL: CPT | Performed by: INTERNAL MEDICINE

## 2023-03-02 PROCEDURE — 82627 DEHYDROEPIANDROSTERONE: CPT | Performed by: INTERNAL MEDICINE

## 2023-03-02 PROCEDURE — 80053 COMPREHEN METABOLIC PANEL: CPT | Performed by: INTERNAL MEDICINE

## 2023-03-02 PROCEDURE — 84146 ASSAY OF PROLACTIN: CPT | Performed by: INTERNAL MEDICINE

## 2023-03-02 PROCEDURE — 99213 OFFICE O/P EST LOW 20 MIN: CPT | Performed by: INTERNAL MEDICINE

## 2023-03-02 RX ORDER — DEXTROAMPHETAMINE SACCHARATE, AMPHETAMINE ASPARTATE MONOHYDRATE, DEXTROAMPHETAMINE SULFATE AND AMPHETAMINE SULFATE 2.5; 2.5; 2.5; 2.5 MG/1; MG/1; MG/1; MG/1
CAPSULE, EXTENDED RELEASE ORAL
COMMUNITY
Start: 2023-03-01

## 2023-03-02 RX ORDER — FLUOXETINE 10 MG/1
CAPSULE ORAL
COMMUNITY
Start: 2023-02-18

## 2023-03-02 NOTE — PROGRESS NOTES
"Subjective   Teodoro Nina is a 27 y.o. male who presents for follow up of gynecomastia    Chief complaint: high estradiol    History of Present Illness     He has h/o excess skin/fat in the chest area but not true gynecomastia.  He had elevated prolactin that was normal on repeat.  The estradiol level was minimally elevated.  The DHEA-S was mildly elevated also.  LH/FSH and testosterone levels were normal.  TFTs and IGF-1 were normal.  He has been working on weight loss.    The following portions of the patient's history were reviewed and updated as appropriate: allergies, current medications, past family history, past medical history, past social history, past surgical history and problem list.    Review of Systems   Constitutional: Negative.    Cardiovascular: Negative.    Gastrointestinal: Negative.    Endocrine: Negative.        Objective   Visit Vitals  /64   Pulse 57   Ht 182.9 cm (72\")   Wt 101 kg (223 lb)   SpO2 98%   BMI 30.24 kg/m²      Physical Exam  Constitutional:       Appearance: Normal appearance.   Neurological:      Mental Status: He is alert.         Assessment & Plan     Diagnoses and all orders for this visit:    1. Gynecomastia (Primary)  Assessment & Plan:  We discussed plastic surgery as an option again today.  He plans to look into this in the future.  He has been exercising with a .  He notes improvement just from building some muscle mass in the chest.      Orders:  -     Comprehensive Metabolic Panel; Future  -     DHEA-Sulfate; Future  -     Estradiol; Future    2. Hyperprolactinemia (HCC)  Assessment & Plan:  Prolactin was normal last visit.  Plan to recheck today.    Orders:  -     Prolactin; Future    3. High serum estradiol  Assessment & Plan:  Mildly elevated last visit.  Will recheck today.    Orders:  -     Estradiol; Future             Current Outpatient Medications   Medication Instructions   • amphetamine-dextroamphetamine XR (ADDERALL XR) 10 MG 24 hr " capsule No dose, route, or frequency recorded.   • FLUoxetine (PROzac) 10 MG capsule No dose, route, or frequency recorded.   • ibuprofen (ADVIL,MOTRIN) 200 mg, Oral, Every 6 Hours PRN   • multivitamin (THERAGRAN) tablet tablet Oral, Daily      Return in about 6 months (around 9/2/2023) for Recheck with CMP, estradiol, PRL, DHEA-S.    Glenn Rutledge MD   03/02/2023

## 2023-03-02 NOTE — ASSESSMENT & PLAN NOTE
We discussed plastic surgery as an option again today.  He plans to look into this in the future.  He has been exercising with a .  He notes improvement just from building some muscle mass in the chest.

## 2023-03-03 LAB
ESTRADIOL SERPL HS-MCNC: 51.4 PG/ML
PROLACTIN SERPL-MCNC: 14.3 NG/ML (ref 4.04–15.2)

## 2023-03-04 LAB — DHEA-S SERPL-MCNC: 346 UG/DL (ref 138.5–475.2)

## (undated) DEVICE — ELECTRD MENISCAL STD 165MM

## (undated) DEVICE — 3M™ STERI-DRAPE™ U-DRAPE 1015: Brand: STERI-DRAPE™

## (undated) DEVICE — GLV SURG SENSICARE SLT PF LF 8 STRL

## (undated) DEVICE — 4.5 MM FULL RADIUS STRAIGHT                                    BLADES, POWER/EP-1, YELLOW, PACKAGED                                    6 PER BOX, STERILE: Brand: DYONICS

## (undated) DEVICE — PROXIMATE SKIN STAPLERS (35 WIDE) CONTAINS 35 STAINLESS STEEL STAPLES (FIXED HEAD): Brand: PROXIMATE

## (undated) DEVICE — TBG ARTHSCP PT W CONN/REDUC 8FT

## (undated) DEVICE — TBG ARTHSCP PUMP W CONN/REDUC 8FT

## (undated) DEVICE — PK KN ARTHSCP 20

## (undated) DEVICE — T-DRAPE,EXTREMITY,STERILE: Brand: MEDLINE

## (undated) DEVICE — GLV SURG SENSICARE ORTHO PF LF 8 STRL

## (undated) DEVICE — DRSNG SURG AQUACEL AG 9X15CM

## (undated) DEVICE — SLV SCD CALF HEMOFORCE DVT THERP REPROC MD

## (undated) DEVICE — PENCL ES MEGADINE EZ/CLEAN BUTN W/HOLSTR 10FT